# Patient Record
Sex: FEMALE | Race: WHITE | Employment: FULL TIME | URBAN - METROPOLITAN AREA
[De-identification: names, ages, dates, MRNs, and addresses within clinical notes are randomized per-mention and may not be internally consistent; named-entity substitution may affect disease eponyms.]

---

## 2017-01-04 ENCOUNTER — OFFICE VISIT (OUTPATIENT)
Dept: DERMATOLOGY CLINIC | Facility: CLINIC | Age: 62
End: 2017-01-04

## 2017-01-04 ENCOUNTER — TELEPHONE (OUTPATIENT)
Dept: OBGYN CLINIC | Facility: CLINIC | Age: 62
End: 2017-01-04

## 2017-01-04 DIAGNOSIS — L82.0 INFLAMED SEBORRHEIC KERATOSIS: Primary | ICD-10-CM

## 2017-01-04 DIAGNOSIS — Z85.828 HISTORY OF SKIN CANCER: ICD-10-CM

## 2017-01-04 DIAGNOSIS — D23.9 BENIGN NEOPLASM OF SKIN, UNSPECIFIED LOCATION: ICD-10-CM

## 2017-01-04 PROCEDURE — 99212 OFFICE O/P EST SF 10 MIN: CPT | Performed by: DERMATOLOGY

## 2017-01-04 PROCEDURE — 99213 OFFICE O/P EST LOW 20 MIN: CPT | Performed by: DERMATOLOGY

## 2017-01-04 NOTE — TELEPHONE ENCOUNTER
----- Message from SUMAN Robbins sent at 1/4/2017 12:11 PM CST -----  Please let pt know additional views of right breast are benign. We will repeat her mammogram in 1 year. Please let me know if she has any questions.     MAF

## 2017-01-30 NOTE — PROGRESS NOTES
Gerard Sullivan is a 57year old female. HPI:     CC:  Patient presents with:  Skin Cancer: Pt here for annual full body check. Pls check crusty lesion R lower back, itches. Pt also wants scalp checked as she had 2 BCC at forehead/hairline.   She feels so 3/30/2011     per NG   • Skin cancer      BCC x 2 at forehead         Past Surgical History    COLONOSCOPY  7/22/2011    Comment per NG    HYSTERECTOMY  1999    Comment TVH-enlarged uterus.  Ovaries remain       Social History   Marital Status:   Spo neck, face,nails, hair, external eyes, including conjunctival mucosa, eyelids, lips external ears, back, chest,/ breasts, axillae,  abdomen, arms, legs, palms.      Multiple light to medium brown, well marginated, uniformly pigmented, macules and papules 6 Sunscreen use, sun protection, self exams reviewed. Followup as noted RTC routine checkup 6 mos - one year or p.r.n.

## 2017-03-11 ENCOUNTER — PRIOR ORIGINAL RECORDS (OUTPATIENT)
Dept: OTHER | Age: 62
End: 2017-03-11

## 2017-03-11 ENCOUNTER — LAB ENCOUNTER (OUTPATIENT)
Dept: LAB | Age: 62
End: 2017-03-11
Attending: INTERNAL MEDICINE
Payer: COMMERCIAL

## 2017-03-11 DIAGNOSIS — Z00.00 ANNUAL PHYSICAL EXAM: ICD-10-CM

## 2017-03-11 LAB
ALBUMIN SERPL BCP-MCNC: 3.8 G/DL (ref 3.5–4.8)
ALBUMIN/GLOB SERPL: 1.3 {RATIO} (ref 1–2)
ALP SERPL-CCNC: 65 U/L (ref 32–100)
ALT SERPL-CCNC: 19 U/L (ref 14–54)
ANION GAP SERPL CALC-SCNC: 5 MMOL/L (ref 0–18)
AST SERPL-CCNC: 18 U/L (ref 15–41)
BASOPHILS # BLD: 0 K/UL (ref 0–0.2)
BASOPHILS NFR BLD: 1 %
BILIRUB SERPL-MCNC: 0.8 MG/DL (ref 0.3–1.2)
BILIRUB UR QL: NEGATIVE
BUN SERPL-MCNC: 15 MG/DL (ref 8–20)
BUN/CREAT SERPL: 18.5 (ref 10–20)
CALCIUM SERPL-MCNC: 8.9 MG/DL (ref 8.5–10.5)
CHLORIDE SERPL-SCNC: 107 MMOL/L (ref 95–110)
CHOLEST SERPL-MCNC: 199 MG/DL (ref 110–200)
CO2 SERPL-SCNC: 29 MMOL/L (ref 22–32)
COLOR UR: YELLOW
CREAT SERPL-MCNC: 0.81 MG/DL (ref 0.5–1.5)
EOSINOPHIL # BLD: 0.1 K/UL (ref 0–0.7)
EOSINOPHIL NFR BLD: 2 %
ERYTHROCYTE [DISTWIDTH] IN BLOOD BY AUTOMATED COUNT: 13.3 % (ref 11–15)
GLOBULIN PLAS-MCNC: 3 G/DL (ref 2.5–3.7)
GLUCOSE SERPL-MCNC: 104 MG/DL (ref 70–99)
GLUCOSE UR-MCNC: NEGATIVE MG/DL
HCT VFR BLD AUTO: 42.6 % (ref 35–48)
HDLC SERPL-MCNC: 52 MG/DL
HGB BLD-MCNC: 14.2 G/DL (ref 12–16)
HGB UR QL STRIP.AUTO: NEGATIVE
HYALINE CASTS #/AREA URNS AUTO: 3 /LPF
KETONES UR-MCNC: NEGATIVE MG/DL
LDLC SERPL CALC-MCNC: 128 MG/DL (ref 0–99)
LYMPHOCYTES # BLD: 1.5 K/UL (ref 1–4)
LYMPHOCYTES NFR BLD: 34 %
MCH RBC QN AUTO: 30.4 PG (ref 27–32)
MCHC RBC AUTO-ENTMCNC: 33.4 G/DL (ref 32–37)
MCV RBC AUTO: 91.2 FL (ref 80–100)
MONOCYTES # BLD: 0.3 K/UL (ref 0–1)
MONOCYTES NFR BLD: 7 %
NEUTROPHILS # BLD AUTO: 2.4 K/UL (ref 1.8–7.7)
NEUTROPHILS NFR BLD: 56 %
NITRITE UR QL STRIP.AUTO: NEGATIVE
NONHDLC SERPL-MCNC: 147 MG/DL
OSMOLALITY UR CALC.SUM OF ELEC: 293 MOSM/KG (ref 275–295)
PH UR: 5 [PH] (ref 5–8)
PLATELET # BLD AUTO: 184 K/UL (ref 140–400)
PMV BLD AUTO: 9 FL (ref 7.4–10.3)
POTASSIUM SERPL-SCNC: 3.9 MMOL/L (ref 3.3–5.1)
PROT SERPL-MCNC: 6.8 G/DL (ref 5.9–8.4)
PROT UR-MCNC: 30 MG/DL
RBC # BLD AUTO: 4.67 M/UL (ref 3.7–5.4)
RBC #/AREA URNS AUTO: 1 /HPF
SODIUM SERPL-SCNC: 141 MMOL/L (ref 136–144)
SP GR UR STRIP: 1.02 (ref 1–1.03)
TRIGL SERPL-MCNC: 95 MG/DL (ref 1–149)
TSH SERPL-ACNC: 1.7 UIU/ML (ref 0.34–5.6)
UROBILINOGEN UR STRIP-ACNC: <2
VIT C UR-MCNC: NEGATIVE MG/DL
WBC # BLD AUTO: 4.4 K/UL (ref 4–11)
WBC #/AREA URNS AUTO: 3 /HPF

## 2017-03-11 PROCEDURE — 80053 COMPREHEN METABOLIC PANEL: CPT

## 2017-03-11 PROCEDURE — 36415 COLL VENOUS BLD VENIPUNCTURE: CPT

## 2017-03-11 PROCEDURE — 81001 URINALYSIS AUTO W/SCOPE: CPT

## 2017-03-11 PROCEDURE — 85025 COMPLETE CBC W/AUTO DIFF WBC: CPT

## 2017-03-11 PROCEDURE — 84443 ASSAY THYROID STIM HORMONE: CPT

## 2017-03-11 PROCEDURE — 80061 LIPID PANEL: CPT

## 2017-05-26 LAB
ALT (SGPT): 19 U/L
AST (SGOT): 18 U/L
BUN: 15 MG/DL
CALCIUM: 8.9 MG/DL
CHLORIDE: 107 MEQ/L
CHOLESTEROL, TOTAL: 199 MG/DL
CREATININE, SERUM: 0.81 MG/DL
GLUCOSE: 104 MG/DL
HDL CHOLESTEROL: 52 MG/DL
HEMATOCRIT: 42.6 %
HEMOGLOBIN: 14.2 G/DL
LDL CHOLESTEROL: 128 MG/DL
MCH: 30.4 PG
MCHC: 33.4 G/DL
MCV: 91.2 FL
NON-HDL CHOLESTEROL: 147 MG/DL
PLATELETS: 184 K/UL
POTASSIUM, SERUM: 3.9 MEQ/L
RED BLOOD COUNT: 4.67 X 10-6/U
SGOT (AST): 18 IU/L
SGPT (ALT): 19 IU/L
SODIUM: 141 MEQ/L
TRIGLYCERIDES: 95 MG/DL
WHITE BLOOD COUNT: 4.4 X 10-3/U

## 2017-05-30 ENCOUNTER — PRIOR ORIGINAL RECORDS (OUTPATIENT)
Dept: OTHER | Age: 62
End: 2017-05-30

## 2017-08-26 ENCOUNTER — HOSPITAL ENCOUNTER (OUTPATIENT)
Dept: CT IMAGING | Facility: HOSPITAL | Age: 62
Discharge: HOME | End: 2017-08-26
Attending: INTERNAL MEDICINE

## 2017-08-26 VITALS — DIASTOLIC BLOOD PRESSURE: 57 MMHG | SYSTOLIC BLOOD PRESSURE: 123 MMHG | HEART RATE: 55 BPM

## 2017-08-26 DIAGNOSIS — Z13.6 SCREENING FOR CARDIOVASCULAR CONDITION: ICD-10-CM

## 2017-11-21 ENCOUNTER — PRIOR ORIGINAL RECORDS (OUTPATIENT)
Dept: OTHER | Age: 62
End: 2017-11-21

## 2017-11-27 ENCOUNTER — PRIOR ORIGINAL RECORDS (OUTPATIENT)
Dept: OTHER | Age: 62
End: 2017-11-27

## 2017-12-23 ENCOUNTER — APPOINTMENT (OUTPATIENT)
Dept: LAB | Facility: HOSPITAL | Age: 62
End: 2017-12-23
Attending: INTERNAL MEDICINE
Payer: COMMERCIAL

## 2017-12-23 ENCOUNTER — PRIOR ORIGINAL RECORDS (OUTPATIENT)
Dept: OTHER | Age: 62
End: 2017-12-23

## 2017-12-23 DIAGNOSIS — E78.00 PURE HYPERCHOLESTEROLEMIA: ICD-10-CM

## 2017-12-23 PROCEDURE — 84450 TRANSFERASE (AST) (SGOT): CPT

## 2017-12-23 PROCEDURE — 80061 LIPID PANEL: CPT

## 2017-12-23 PROCEDURE — 84460 ALANINE AMINO (ALT) (SGPT): CPT

## 2017-12-23 PROCEDURE — 36415 COLL VENOUS BLD VENIPUNCTURE: CPT

## 2017-12-26 LAB
ALT (SGPT): 22 U/L
AST (SGOT): 19 U/L
CHOLESTEROL, TOTAL: 199 MG/DL
HDL CHOLESTEROL: 55 MG/DL
LDL CHOLESTEROL: 125 MG/DL
TRIGLYCERIDES: 97 MG/DL

## 2017-12-27 ENCOUNTER — OFFICE VISIT (OUTPATIENT)
Dept: INTERNAL MEDICINE CLINIC | Facility: CLINIC | Age: 62
End: 2017-12-27

## 2017-12-27 ENCOUNTER — TELEPHONE (OUTPATIENT)
Dept: INTERNAL MEDICINE CLINIC | Facility: CLINIC | Age: 62
End: 2017-12-27

## 2017-12-27 VITALS
HEART RATE: 58 BPM | WEIGHT: 187.38 LBS | HEIGHT: 64.5 IN | TEMPERATURE: 98 F | BODY MASS INDEX: 31.6 KG/M2 | SYSTOLIC BLOOD PRESSURE: 138 MMHG | OXYGEN SATURATION: 98 % | DIASTOLIC BLOOD PRESSURE: 74 MMHG

## 2017-12-27 DIAGNOSIS — R73.01 IMPAIRED FASTING GLUCOSE: ICD-10-CM

## 2017-12-27 DIAGNOSIS — M85.80 OSTEOPENIA, UNSPECIFIED LOCATION: ICD-10-CM

## 2017-12-27 DIAGNOSIS — T75.3XXA MOTION SICKNESS, INITIAL ENCOUNTER: ICD-10-CM

## 2017-12-27 DIAGNOSIS — Z11.59 NEED FOR HEPATITIS C SCREENING TEST: Primary | ICD-10-CM

## 2017-12-27 DIAGNOSIS — Z00.00 ANNUAL PHYSICAL EXAM: Primary | ICD-10-CM

## 2017-12-27 DIAGNOSIS — Z23 NEED FOR VACCINATION: ICD-10-CM

## 2017-12-27 PROCEDURE — 99386 PREV VISIT NEW AGE 40-64: CPT | Performed by: INTERNAL MEDICINE

## 2017-12-27 PROCEDURE — 90715 TDAP VACCINE 7 YRS/> IM: CPT | Performed by: INTERNAL MEDICINE

## 2017-12-27 PROCEDURE — 90471 IMMUNIZATION ADMIN: CPT | Performed by: INTERNAL MEDICINE

## 2017-12-27 RX ORDER — MECLIZINE HYDROCHLORIDE 25 MG/1
25 TABLET ORAL 3 TIMES DAILY PRN
Qty: 30 TABLET | Refills: 1 | Status: SHIPPED | OUTPATIENT
Start: 2017-12-27 | End: 2018-08-23

## 2017-12-27 NOTE — PROGRESS NOTES
Ceci Mcelroy is a 60year old female.     Chief complaint:   annual physical exam   HPI:     60year old female with PMH as listed below here for   Annual physical exam   Patient reports over the last month when she is looking through the train window feel examination at a [facility] facility     Aortic valve insufficiency      REVIEW OF SYSTEMS:   A comprehensive 10 point review of systems was completed.   Pertinent positives and negatives noted in the the HPI            EXAM:   /74 (BP Location: [facility]

## 2017-12-27 NOTE — PATIENT INSTRUCTIONS
Preventing Osteoporosis: Meeting Your Calcium Needs    Your body needs calcium to build and repair bones. But it can't make calcium on its own. That's why it's important to eat calcium-rich foods. Some foods are naturally rich in calcium.  Others have eileen Certain factors can speed up bone loss or decrease bone growth. For example, alcohol, cigarettes, and certain medicines reduce bone mass. Some foods make it hard for your body to absorb calcium.     Things to avoid  Here are things to avoid to help prevent

## 2018-01-08 ENCOUNTER — PRIOR ORIGINAL RECORDS (OUTPATIENT)
Dept: OTHER | Age: 63
End: 2018-01-08

## 2018-01-10 ENCOUNTER — PRIOR ORIGINAL RECORDS (OUTPATIENT)
Dept: OTHER | Age: 63
End: 2018-01-10

## 2018-01-27 ENCOUNTER — HOSPITAL ENCOUNTER (OUTPATIENT)
Dept: MAMMOGRAPHY | Facility: HOSPITAL | Age: 63
Discharge: HOME | End: 2018-01-27
Attending: INTERNAL MEDICINE
Payer: COMMERCIAL

## 2018-01-27 DIAGNOSIS — M85.80 OSTEOPENIA, UNSPECIFIED LOCATION: ICD-10-CM

## 2018-01-27 DIAGNOSIS — Z23 NEED FOR VACCINATION: ICD-10-CM

## 2018-01-27 DIAGNOSIS — T75.3XXA MOTION SICKNESS, INITIAL ENCOUNTER: ICD-10-CM

## 2018-01-27 DIAGNOSIS — R73.01 IMPAIRED FASTING GLUCOSE: ICD-10-CM

## 2018-01-27 DIAGNOSIS — Z00.00 ANNUAL PHYSICAL EXAM: ICD-10-CM

## 2018-01-27 PROCEDURE — 77067 SCR MAMMO BI INCL CAD: CPT | Performed by: INTERNAL MEDICINE

## 2018-03-10 ENCOUNTER — APPOINTMENT (OUTPATIENT)
Dept: LAB | Age: 63
End: 2018-03-10
Attending: DERMATOLOGY
Payer: COMMERCIAL

## 2018-03-10 ENCOUNTER — OFFICE VISIT (OUTPATIENT)
Dept: DERMATOLOGY CLINIC | Facility: CLINIC | Age: 63
End: 2018-03-10

## 2018-03-10 DIAGNOSIS — L82.0 INFLAMED SEBORRHEIC KERATOSIS: ICD-10-CM

## 2018-03-10 DIAGNOSIS — D48.5 NEOPLASM OF UNCERTAIN BEHAVIOR OF SKIN: Primary | ICD-10-CM

## 2018-03-10 DIAGNOSIS — Z85.828 HISTORY OF SKIN CANCER: ICD-10-CM

## 2018-03-10 DIAGNOSIS — D23.5 BENIGN NEOPLASM OF SKIN OF TRUNK, EXCEPT SCROTUM: ICD-10-CM

## 2018-03-10 DIAGNOSIS — D23.4 BENIGN NEOPLASM OF SCALP AND SKIN OF NECK: ICD-10-CM

## 2018-03-10 DIAGNOSIS — D23.60 BENIGN NEOPLASM OF SKIN OF UPPER LIMB, INCLUDING SHOULDER, UNSPECIFIED LATERALITY: ICD-10-CM

## 2018-03-10 DIAGNOSIS — D23.30 BENIGN NEOPLASM OF SKIN OF FACE: ICD-10-CM

## 2018-03-10 PROCEDURE — 88305 TISSUE EXAM BY PATHOLOGIST: CPT | Performed by: DERMATOLOGY

## 2018-03-10 PROCEDURE — 99212 OFFICE O/P EST SF 10 MIN: CPT | Performed by: DERMATOLOGY

## 2018-03-10 PROCEDURE — 99213 OFFICE O/P EST LOW 20 MIN: CPT | Performed by: DERMATOLOGY

## 2018-03-10 PROCEDURE — 11100 BIOPSY OF SKIN LESION: CPT | Performed by: DERMATOLOGY

## 2018-03-13 NOTE — PROGRESS NOTES
The pathology report from last visit showed benign compound nevus no atypia from mid back   . Please log in test results, send biopsy results letter. Pt to rtc in the fall  or prn.

## 2018-03-13 NOTE — PROGRESS NOTES
Path as noted logged in book and letter sent per [facility]LEST[facility] 3-13-18.

## 2018-03-19 NOTE — PROGRESS NOTES
Chapito Malloy is a 60year old female. HPI:     CC:  Patient presents with:  Full Skin Exam: LOV 1/4/22017. Pt presenting for full body skin exam. Pt has a personal hx of BCC.          Allergies:  Amoxicillin    HISTORY:    Past Medical History:   Daisy Jean 1999   • Skin cancer     BCC x 2 at forehead   • Unspecified essential hypertension     drug therapy     Past Surgical History:  7/22/2011: COLONOSCOPY      Comment: rosette NORMAN  1999: HYSTERECTOMY      Comment: TVH-enlarged uterus.  Ovaries remain    Social His axillae,  abdomen, arms, legs, palms. Multiple light to medium brown, well marginated, uniformly pigmented, macules and papules 6 mm and less scattered on exam. pigmented lesions examined with dermoscopy benign-appearing patterns.      Waxy Liang Scale therapies. Please refer to map for specific lesions. See additional diagnoses. Pros cons of various therapies, risks benefits discussed. Pathophysiology discussed with patient. Therapeutic options reviewed. See  Medications in grid.   Instructions rev

## 2018-04-14 ENCOUNTER — LAB ENCOUNTER (OUTPATIENT)
Dept: LAB | Facility: HOSPITAL | Age: 63
End: 2018-04-14
Attending: INTERNAL MEDICINE
Payer: COMMERCIAL

## 2018-04-14 ENCOUNTER — PRIOR ORIGINAL RECORDS (OUTPATIENT)
Dept: OTHER | Age: 63
End: 2018-04-14

## 2018-04-14 DIAGNOSIS — M85.80 OSTEOPENIA, UNSPECIFIED LOCATION: ICD-10-CM

## 2018-04-14 DIAGNOSIS — Z11.59 NEED FOR HEPATITIS C SCREENING TEST: ICD-10-CM

## 2018-04-14 DIAGNOSIS — Z00.00 ANNUAL PHYSICAL EXAM: ICD-10-CM

## 2018-04-14 DIAGNOSIS — R73.01 IMPAIRED FASTING GLUCOSE: ICD-10-CM

## 2018-04-14 DIAGNOSIS — Z23 NEED FOR VACCINATION: ICD-10-CM

## 2018-04-14 DIAGNOSIS — E78.00 HYPERCHOLESTEREMIA: ICD-10-CM

## 2018-04-14 DIAGNOSIS — T75.3XXA MOTION SICKNESS, INITIAL ENCOUNTER: ICD-10-CM

## 2018-04-14 PROCEDURE — 80050 GENERAL HEALTH PANEL: CPT

## 2018-04-14 PROCEDURE — 80053 COMPREHEN METABOLIC PANEL: CPT

## 2018-04-14 PROCEDURE — 82248 BILIRUBIN DIRECT: CPT

## 2018-04-14 PROCEDURE — 82465 ASSAY BLD/SERUM CHOLESTEROL: CPT

## 2018-04-14 PROCEDURE — 86803 HEPATITIS C AB TEST: CPT

## 2018-04-14 PROCEDURE — 36415 COLL VENOUS BLD VENIPUNCTURE: CPT

## 2018-04-14 PROCEDURE — 85025 COMPLETE CBC W/AUTO DIFF WBC: CPT

## 2018-04-14 PROCEDURE — 83036 HEMOGLOBIN GLYCOSYLATED A1C: CPT

## 2018-04-16 ENCOUNTER — TELEPHONE (OUTPATIENT)
Dept: INTERNAL MEDICINE CLINIC | Facility: CLINIC | Age: 63
End: 2018-04-16

## 2018-04-16 DIAGNOSIS — E78.5 HYPERLIPIDEMIA, UNSPECIFIED HYPERLIPIDEMIA TYPE: Primary | ICD-10-CM

## 2018-04-16 LAB
ALBUMIN: 4 G/DL
ALT (SGPT): 17 U/L
AST (SGOT): 18 U/L
BILIRUBIN TOTAL: 0.8 MG/DL
BUN: 10 MG/DL
CALCIUM: 9.1 MG/DL
CHLORIDE: 107 MEQ/L
CHOLESTEROL, TOTAL: 214 MG/DL
CREATININE, SERUM: 0.87 MG/DL
GLOBULIN: 3.1 G/DL
GLUCOSE: 108 MG/DL
GLUCOSE: 108 MG/DL
HEMATOCRIT: 43.2 %
HEMOGLOBIN A1C: 5.8 %
HEMOGLOBIN: 14.4 G/DL
MCH: 30.8 PG
MCHC: 33.3 G/DL
MCV: 92.5 FL
PLATELETS: 201 K/UL
POTASSIUM, SERUM: 4.3 MEQ/L
PROTEIN, TOTAL: 7.1 G/DL
RED BLOOD COUNT: 4.67 X 10-6/U
SGOT (AST): 18 IU/L
SGPT (ALT): 17 IU/L
SODIUM: 141 MEQ/L
THYROID STIMULATING HORMONE: 1.9 MLU/L
WHITE BLOOD COUNT: 5.1 X 10-3/U

## 2018-06-12 ENCOUNTER — PRIOR ORIGINAL RECORDS (OUTPATIENT)
Dept: OTHER | Age: 63
End: 2018-06-12

## 2018-06-23 ENCOUNTER — OFFICE VISIT (OUTPATIENT)
Dept: INTERNAL MEDICINE CLINIC | Facility: CLINIC | Age: 63
End: 2018-06-23

## 2018-06-23 VITALS
OXYGEN SATURATION: 99 % | WEIGHT: 179.63 LBS | HEIGHT: 64.5 IN | HEART RATE: 52 BPM | TEMPERATURE: 99 F | DIASTOLIC BLOOD PRESSURE: 74 MMHG | SYSTOLIC BLOOD PRESSURE: 128 MMHG | BODY MASS INDEX: 30.29 KG/M2

## 2018-06-23 DIAGNOSIS — R73.03 PREDIABETES: Primary | ICD-10-CM

## 2018-06-23 DIAGNOSIS — I35.1 AORTIC VALVE INSUFFICIENCY, ETIOLOGY OF CARDIAC VALVE DISEASE UNSPECIFIED: ICD-10-CM

## 2018-06-23 PROCEDURE — 99213 OFFICE O/P EST LOW 20 MIN: CPT | Performed by: INTERNAL MEDICINE

## 2018-06-23 NOTE — PROGRESS NOTES
Azeem Armando is a 60year old female.     Chief complaint:follow up on chronic conditions     HPI:         60year old female with PMh as listed below here for   Follow up on chronic conditions   Patient if feeling well   No chest pain no sob no abdomina List:     Routine general medical examination at a health care facility     Aortic valve insufficiency      REVIEW OF SYSTEMS:   A comprehensive 10 point review of systems was completed.   Pertinent positives and negatives noted in the the HPI            EX

## 2018-07-21 ENCOUNTER — MYAURORA ACCOUNT LINK (OUTPATIENT)
Dept: OTHER | Age: 63
End: 2018-07-21

## 2018-08-01 ENCOUNTER — PRIOR ORIGINAL RECORDS (OUTPATIENT)
Dept: OTHER | Age: 63
End: 2018-08-01

## 2018-08-23 DIAGNOSIS — T75.3XXA MOTION SICKNESS, INITIAL ENCOUNTER: ICD-10-CM

## 2018-08-23 DIAGNOSIS — Z00.00 ANNUAL PHYSICAL EXAM: ICD-10-CM

## 2018-08-23 DIAGNOSIS — Z23 NEED FOR VACCINATION: ICD-10-CM

## 2018-08-23 DIAGNOSIS — R73.01 IMPAIRED FASTING GLUCOSE: ICD-10-CM

## 2018-08-23 DIAGNOSIS — M85.80 OSTEOPENIA, UNSPECIFIED LOCATION: ICD-10-CM

## 2018-08-23 RX ORDER — MECLIZINE HYDROCHLORIDE 25 MG/1
25 TABLET ORAL 3 TIMES DAILY PRN
Qty: 30 TABLET | Refills: 1 | Status: SHIPPED | OUTPATIENT
Start: 2018-08-23 | End: 2019-09-27

## 2019-02-18 ENCOUNTER — OFFICE VISIT (OUTPATIENT)
Dept: INTERNAL MEDICINE CLINIC | Facility: CLINIC | Age: 64
End: 2019-02-18
Payer: COMMERCIAL

## 2019-02-18 VITALS
DIASTOLIC BLOOD PRESSURE: 72 MMHG | HEART RATE: 53 BPM | RESPIRATION RATE: 18 BRPM | SYSTOLIC BLOOD PRESSURE: 110 MMHG | BODY MASS INDEX: 31.54 KG/M2 | HEIGHT: 64.5 IN | WEIGHT: 187 LBS | TEMPERATURE: 98 F | OXYGEN SATURATION: 99 %

## 2019-02-18 DIAGNOSIS — Z00.00 ANNUAL PHYSICAL EXAM: Primary | ICD-10-CM

## 2019-02-18 DIAGNOSIS — Z90.710 H/O: HYSTERECTOMY: ICD-10-CM

## 2019-02-18 DIAGNOSIS — Z12.39 SCREENING FOR BREAST CANCER: ICD-10-CM

## 2019-02-18 DIAGNOSIS — M85.80 OSTEOPENIA, UNSPECIFIED LOCATION: ICD-10-CM

## 2019-02-18 LAB
ABSOLUTE IMMATURE GRANULOCYTES (OFFPRE24): NORMAL
ALBUMIN SERPL-MCNC: 3.8 G/DL
ALBUMIN SERPL-MCNC: 3.8 G/DL (ref 3.4–5)
ALBUMIN/GLOB SERPL: 1 {RATIO}
ALBUMIN/GLOB SERPL: 1 {RATIO} (ref 1–2)
ALP LIVER SERPL-CCNC: 78 U/L (ref 50–130)
ALP SERPL-CCNC: 78 U/L
ALT SERPL-CCNC: 26 U/L
ALT SERPL-CCNC: 26 U/L (ref 13–56)
ANION GAP SERPL CALC-SCNC: 8 MMOL/L
ANION GAP SERPL CALC-SCNC: 8 MMOL/L (ref 0–18)
AST SERPL-CCNC: 16 U/L
AST SERPL-CCNC: 16 U/L (ref 15–37)
BASO+EOS+MONOS # BLD: NORMAL 10*3/UL
BASO+EOS+MONOS NFR BLD: NORMAL %
BASOPHILS # BLD AUTO: 0.03 X10(3) UL (ref 0–0.2)
BASOPHILS # BLD: NORMAL 10*3/UL
BASOPHILS NFR BLD AUTO: 0.6 %
BASOPHILS NFR BLD: NORMAL %
BILIRUB SERPL-MCNC: 0.4 MG/DL
BILIRUB SERPL-MCNC: 0.4 MG/DL (ref 0.1–2)
BUN BLD-MCNC: 14 MG/DL (ref 7–18)
BUN SERPL-MCNC: 14 MG/DL
BUN/CREAT SERPL: 14.3
BUN/CREAT SERPL: 14.3 (ref 10–20)
CALCIUM BLD-MCNC: 9.2 MG/DL (ref 8.5–10.1)
CALCIUM SERPL-MCNC: 9.2 MG/DL
CHLORIDE SERPL-SCNC: 110 MMOL/L
CHLORIDE SERPL-SCNC: 110 MMOL/L (ref 98–107)
CHOLEST SERPL-MCNC: 205 MG/DL
CHOLEST SMN-MCNC: 205 MG/DL (ref ?–200)
CHOLEST/HDLC SERPL: NORMAL {RATIO}
CO2 SERPL-SCNC: 27 MMOL/L
CO2 SERPL-SCNC: 27 MMOL/L (ref 21–32)
CREAT BLD-MCNC: 0.98 MG/DL (ref 0.55–1.02)
CREAT SERPL-MCNC: 0.98 MG/DL
DEPRECATED RDW RBC AUTO: 50.5 FL (ref 35.1–46.3)
DIFFERENTIAL METHOD BLD: NORMAL
EOSINOPHIL # BLD AUTO: 0.11 X10(3) UL (ref 0–0.7)
EOSINOPHIL # BLD: NORMAL 10*3/UL
EOSINOPHIL NFR BLD AUTO: 2.4 %
EOSINOPHIL NFR BLD: NORMAL %
ERYTHROCYTE [DISTWIDTH] IN BLOOD BY AUTOMATED COUNT: 13.9 % (ref 11–15)
ERYTHROCYTE [DISTWIDTH] IN BLOOD: NORMAL %
EST. AVERAGE GLUCOSE BLD GHB EST-MCNC: 126 MG/DL (ref 68–126)
GLOBULIN PLAS-MCNC: 3.7 G/DL (ref 2.8–4.4)
GLOBULIN SER-MCNC: 3.7 G/DL
GLUCOSE BLD-MCNC: 88 MG/DL (ref 70–99)
GLUCOSE SERPL-MCNC: 88 MG/DL
HBA1C MFR BLD HPLC: 6 % (ref ?–5.7)
HCT VFR BLD AUTO: 46.3 % (ref 35–48)
HCT VFR BLD CALC: 46.3 %
HDLC SERPL-MCNC: 61 MG/DL
HDLC SERPL-MCNC: 61 MG/DL (ref 40–59)
HGB BLD-MCNC: 14 G/DL
HGB BLD-MCNC: 14 G/DL (ref 12–16)
IMM GRANULOCYTES # BLD AUTO: 0.01 X10(3) UL (ref 0–1)
IMM GRANULOCYTES NFR BLD: 0.2 %
IMMATURE GRANULOCYTES (OFFPRE25): NORMAL
LDLC SERPL CALC-MCNC: 128 MG/DL
LDLC SERPL CALC-MCNC: 128 MG/DL (ref ?–100)
LENGTH OF FAST TIME PATIENT: NORMAL H
LENGTH OF FAST TIME PATIENT: NORMAL H
LYMPHOCYTES # BLD AUTO: 1.54 X10(3) UL (ref 1–4)
LYMPHOCYTES # BLD: NORMAL 10*3/UL
LYMPHOCYTES NFR BLD AUTO: 33 %
LYMPHOCYTES NFR BLD: NORMAL %
M PROTEIN MFR SERPL ELPH: 7.5 G/DL (ref 6.4–8.2)
MCH RBC QN AUTO: 30 PG (ref 26–34)
MCH RBC QN AUTO: NORMAL PG
MCHC RBC AUTO-ENTMCNC: 30.2 G/DL (ref 31–37)
MCHC RBC AUTO-ENTMCNC: NORMAL G/DL
MCV RBC AUTO: 99.1 FL (ref 80–100)
MCV RBC AUTO: NORMAL FL
MONOCYTES # BLD AUTO: 0.3 X10(3) UL (ref 0.1–1)
MONOCYTES # BLD: NORMAL 10*3/UL
MONOCYTES NFR BLD AUTO: 6.4 %
MONOCYTES NFR BLD: NORMAL %
MPV (OFFPRE2): NORMAL
NEUTROPHILS # BLD AUTO: 2.68 X10 (3) UL (ref 1.5–7.7)
NEUTROPHILS # BLD AUTO: 2.68 X10(3) UL (ref 1.5–7.7)
NEUTROPHILS # BLD: NORMAL 10*3/UL
NEUTROPHILS NFR BLD AUTO: 57.4 %
NEUTROPHILS NFR BLD: NORMAL %
NONHDLC SERPL-MCNC: 144 MG/DL
NONHDLC SERPL-MCNC: 144 MG/DL (ref ?–130)
NRBC BLD MANUAL-RTO: NORMAL %
OSMOLALITY SERPL CALC.SUM OF ELEC: 300 MOSM/KG (ref 275–295)
PLAT MORPH BLD: NORMAL
PLATELET # BLD AUTO: 202 10(3)UL (ref 150–450)
PLATELET # BLD: 202 10*3/UL
POTASSIUM SERPL-SCNC: 4.1 MMOL/L
POTASSIUM SERPL-SCNC: 4.1 MMOL/L (ref 3.5–5.1)
PROT SERPL-MCNC: 7.5 G/DL
RBC # BLD AUTO: 4.67 X10(6)UL (ref 3.8–5.3)
RBC # BLD: 4.67 10*6/UL
RBC MORPH BLD: NORMAL
SODIUM SERPL-SCNC: 145 MMOL/L
SODIUM SERPL-SCNC: 145 MMOL/L (ref 136–145)
TRIGL SERPL-MCNC: 78 MG/DL
TRIGL SERPL-MCNC: 78 MG/DL (ref 30–149)
TSI SER-ACNC: 1.57 MIU/ML (ref 0.36–3.74)
VLDLC SERPL CALC-MCNC: NORMAL MG/DL
WBC # BLD AUTO: 4.7 X10(3) UL (ref 4–11)
WBC # BLD: 4.7 10*3/UL
WBC MORPH BLD: NORMAL

## 2019-02-18 PROCEDURE — 82306 VITAMIN D 25 HYDROXY: CPT | Performed by: INTERNAL MEDICINE

## 2019-02-18 PROCEDURE — 80050 GENERAL HEALTH PANEL: CPT | Performed by: INTERNAL MEDICINE

## 2019-02-18 PROCEDURE — 80061 LIPID PANEL: CPT | Performed by: INTERNAL MEDICINE

## 2019-02-18 PROCEDURE — 83036 HEMOGLOBIN GLYCOSYLATED A1C: CPT | Performed by: INTERNAL MEDICINE

## 2019-02-18 PROCEDURE — 99396 PREV VISIT EST AGE 40-64: CPT | Performed by: INTERNAL MEDICINE

## 2019-02-18 NOTE — PROGRESS NOTES
Steve Matthews is a 58year old female.     Chief complaint:  Annual physical exam   HPI:     58year old female with PMH as listed below here for   Annual physical exam   Patient's last pap was couple of year ago   Hx of hysterectomy   No hx of hpv or pre 61        basal cell     Patient Active Problem List:     Routine general medical examination at a [facility] facility     Aortic valve insufficiency      REVIEW OF SYSTEMS:   A comprehensive 10 point review of systems was completed.   Pertinent positives WITH         PLATELET, COMP METABOLIC PANEL (14), LIPID         PANEL, HEMOGLOBIN A1C, TSH W REFLEX TO FREE T4,        VITAMIN D, 25-HYDROXY, THINPREP PAP WITH HPV         REFLEX REQUEST B, CBC WITH DIFFERENTIAL WITH         PLATELET, COMP METABOLIC PANEL

## 2019-02-19 LAB — LAST PAP RESULT: NORMAL

## 2019-02-20 LAB — 25(OH)D3 SERPL-MCNC: 31.9 NG/ML (ref 30–100)

## 2019-02-23 ENCOUNTER — HOSPITAL ENCOUNTER (OUTPATIENT)
Dept: BONE DENSITY | Age: 64
Discharge: HOME | End: 2019-02-23
Attending: INTERNAL MEDICINE
Payer: COMMERCIAL

## 2019-02-23 DIAGNOSIS — Z00.00 ANNUAL PHYSICAL EXAM: ICD-10-CM

## 2019-02-23 DIAGNOSIS — Z12.39 SCREENING FOR BREAST CANCER: ICD-10-CM

## 2019-02-23 DIAGNOSIS — Z90.710 H/O: HYSTERECTOMY: ICD-10-CM

## 2019-02-23 DIAGNOSIS — M85.80 OSTEOPENIA, UNSPECIFIED LOCATION: ICD-10-CM

## 2019-02-23 PROCEDURE — 77080 DXA BONE DENSITY AXIAL: CPT | Performed by: INTERNAL MEDICINE

## 2019-02-28 VITALS
BODY MASS INDEX: 30.73 KG/M2 | HEART RATE: 61 BPM | RESPIRATION RATE: 18 BRPM | SYSTOLIC BLOOD PRESSURE: 120 MMHG | WEIGHT: 180 LBS | OXYGEN SATURATION: 96 % | DIASTOLIC BLOOD PRESSURE: 78 MMHG | HEIGHT: 64 IN

## 2019-02-28 VITALS
HEIGHT: 64 IN | DIASTOLIC BLOOD PRESSURE: 64 MMHG | WEIGHT: 189 LBS | RESPIRATION RATE: 18 BRPM | SYSTOLIC BLOOD PRESSURE: 122 MMHG | HEART RATE: 56 BPM | BODY MASS INDEX: 32.27 KG/M2

## 2019-03-01 VITALS
HEART RATE: 57 BPM | SYSTOLIC BLOOD PRESSURE: 122 MMHG | HEIGHT: 64 IN | WEIGHT: 183 LBS | BODY MASS INDEX: 31.24 KG/M2 | DIASTOLIC BLOOD PRESSURE: 74 MMHG | RESPIRATION RATE: 16 BRPM

## 2019-03-16 ENCOUNTER — HOSPITAL ENCOUNTER (OUTPATIENT)
Dept: MAMMOGRAPHY | Facility: HOSPITAL | Age: 64
Discharge: HOME | End: 2019-03-16
Attending: INTERNAL MEDICINE
Payer: COMMERCIAL

## 2019-03-16 DIAGNOSIS — Z12.39 SCREENING FOR BREAST CANCER: ICD-10-CM

## 2019-03-16 DIAGNOSIS — Z00.00 ANNUAL PHYSICAL EXAM: ICD-10-CM

## 2019-03-16 DIAGNOSIS — Z90.710 H/O: HYSTERECTOMY: ICD-10-CM

## 2019-03-16 DIAGNOSIS — M85.80 OSTEOPENIA, UNSPECIFIED LOCATION: ICD-10-CM

## 2019-03-16 PROCEDURE — 77067 SCR MAMMO BI INCL CAD: CPT | Performed by: INTERNAL MEDICINE

## 2019-03-16 PROCEDURE — 77063 BREAST TOMOSYNTHESIS BI: CPT | Performed by: INTERNAL MEDICINE

## 2019-03-26 RX ORDER — LISINOPRIL 5 MG/1
TABLET ORAL
Qty: 90 TABLET | Refills: 2 | Status: SHIPPED | OUTPATIENT
Start: 2019-03-26 | End: 2019-12-31 | Stop reason: SDUPTHER

## 2019-03-28 RX ORDER — MECLIZINE HYDROCHLORIDE 25 MG/1
25 TABLET ORAL 3 TIMES DAILY PRN
COMMUNITY

## 2019-03-29 ENCOUNTER — HOSPITAL ENCOUNTER (OUTPATIENT)
Dept: ULTRASOUND IMAGING | Facility: HOSPITAL | Age: 64
Discharge: HOME | End: 2019-03-29
Attending: INTERNAL MEDICINE
Payer: COMMERCIAL

## 2019-03-29 ENCOUNTER — HOSPITAL ENCOUNTER (OUTPATIENT)
Dept: MAMMOGRAPHY | Facility: HOSPITAL | Age: 64
Discharge: HOME | End: 2019-03-29
Attending: INTERNAL MEDICINE
Payer: COMMERCIAL

## 2019-03-29 DIAGNOSIS — R92.8 ABNORMAL MAMMOGRAM: ICD-10-CM

## 2019-03-29 PROCEDURE — 77061 BREAST TOMOSYNTHESIS UNI: CPT | Performed by: INTERNAL MEDICINE

## 2019-03-29 PROCEDURE — 77065 DX MAMMO INCL CAD UNI: CPT | Performed by: INTERNAL MEDICINE

## 2019-03-29 PROCEDURE — 76642 ULTRASOUND BREAST LIMITED: CPT | Performed by: INTERNAL MEDICINE

## 2019-05-13 ENCOUNTER — OFFICE VISIT (OUTPATIENT)
Dept: DERMATOLOGY CLINIC | Facility: CLINIC | Age: 64
End: 2019-05-13
Payer: COMMERCIAL

## 2019-05-13 VITALS — BODY MASS INDEX: 31.58 KG/M2 | WEIGHT: 185 LBS | HEIGHT: 64 IN

## 2019-05-13 DIAGNOSIS — L82.0 INFLAMED SEBORRHEIC KERATOSIS: Primary | ICD-10-CM

## 2019-05-13 DIAGNOSIS — Z85.828 HISTORY OF NONMELANOMA SKIN CANCER: ICD-10-CM

## 2019-05-13 DIAGNOSIS — D23.5 BENIGN NEOPLASM OF SKIN OF TRUNK, EXCEPT SCROTUM: ICD-10-CM

## 2019-05-13 DIAGNOSIS — D23.30 BENIGN NEOPLASM OF SKIN OF FACE: ICD-10-CM

## 2019-05-13 DIAGNOSIS — D23.60 BENIGN NEOPLASM OF SKIN OF UPPER LIMB, INCLUDING SHOULDER, UNSPECIFIED LATERALITY: ICD-10-CM

## 2019-05-13 DIAGNOSIS — D23.4 BENIGN NEOPLASM OF SCALP AND SKIN OF NECK: ICD-10-CM

## 2019-05-13 DIAGNOSIS — D22.9 MULTIPLE NEVI: ICD-10-CM

## 2019-05-13 PROCEDURE — 99212 OFFICE O/P EST SF 10 MIN: CPT | Performed by: DERMATOLOGY

## 2019-05-13 PROCEDURE — 99214 OFFICE O/P EST MOD 30 MIN: CPT | Performed by: DERMATOLOGY

## 2019-05-16 RX ORDER — FAMOTIDINE 20 MG
1000 TABLET ORAL DAILY
COMMUNITY
Start: 2013-12-24

## 2019-05-26 NOTE — PROGRESS NOTES
Carmine Christine is a 58year old female.   HPI:     CC:  Patient presents with:  Moles: skin check        Allergies:  Amoxicillin    HISTORY:    Past Medical History:   Diagnosis Date   • Basal cell carcinoma     wide excision, 2010   • Essential tremor hypertension     drug therapy     Past Surgical History:   Procedure Laterality Date   • COLONOSCOPY  7/22/2011    per NG   • HYSTERECTOMY  1999    TVH-enlarged uterus.  Ovaries remain     Social History    Socioeconomic History      Marital status:  Asked        Back Care: Not Asked        Exercise: Not Asked        Bike Helmet: Not Asked        Seat Belt: Not Asked        Self-Exams: Not Asked        Grew up on a farm: Not Asked        History of tanning: Not Asked        Outdoor occupation: Not Aske Santana Baker for additional history and physical exam also:    Assessment / plan:    No orders of the defined types were placed in this encounter.       Meds & Refills for this Visit:  Requested Prescriptions      No prescriptions requested or ordered in this enco melanoma discussed with patient. Sunscreen use, sun protection, self exams reviewed. Followup as noted RTC routine checkup 6 mos - one year or p.r.n. The patient indicates understanding of these issues and agrees to the plan.   The patient is asked to r

## 2019-05-28 ENCOUNTER — OFFICE VISIT (OUTPATIENT)
Dept: CARDIOLOGY | Age: 64
End: 2019-05-28

## 2019-05-28 ENCOUNTER — TELEPHONE (OUTPATIENT)
Dept: CARDIOLOGY | Age: 64
End: 2019-05-28

## 2019-05-28 VITALS
DIASTOLIC BLOOD PRESSURE: 70 MMHG | HEIGHT: 64 IN | BODY MASS INDEX: 31.41 KG/M2 | WEIGHT: 184 LBS | HEART RATE: 54 BPM | OXYGEN SATURATION: 98 % | SYSTOLIC BLOOD PRESSURE: 118 MMHG

## 2019-05-28 DIAGNOSIS — R07.89 ATYPICAL CHEST PAIN: ICD-10-CM

## 2019-05-28 DIAGNOSIS — I35.1 NONRHEUMATIC AORTIC VALVE INSUFFICIENCY: Primary | ICD-10-CM

## 2019-05-28 DIAGNOSIS — E78.00 PURE HYPERCHOLESTEROLEMIA: ICD-10-CM

## 2019-05-28 DIAGNOSIS — I10 ESSENTIAL HYPERTENSION: ICD-10-CM

## 2019-05-28 PROCEDURE — 3078F DIAST BP <80 MM HG: CPT | Performed by: INTERNAL MEDICINE

## 2019-05-28 PROCEDURE — 3074F SYST BP LT 130 MM HG: CPT | Performed by: INTERNAL MEDICINE

## 2019-05-28 PROCEDURE — 93000 ELECTROCARDIOGRAM COMPLETE: CPT | Performed by: INTERNAL MEDICINE

## 2019-05-28 PROCEDURE — 99214 OFFICE O/P EST MOD 30 MIN: CPT | Performed by: INTERNAL MEDICINE

## 2019-05-28 ASSESSMENT — PATIENT HEALTH QUESTIONNAIRE - PHQ9
SUM OF ALL RESPONSES TO PHQ9 QUESTIONS 1 AND 2: 0
2. FEELING DOWN, DEPRESSED OR HOPELESS: NOT AT ALL
1. LITTLE INTEREST OR PLEASURE IN DOING THINGS: NOT AT ALL
SUM OF ALL RESPONSES TO PHQ9 QUESTIONS 1 AND 2: 0

## 2019-06-12 DIAGNOSIS — I10 ESSENTIAL HYPERTENSION: ICD-10-CM

## 2019-06-12 DIAGNOSIS — E78.00 PURE HYPERCHOLESTEROLEMIA: ICD-10-CM

## 2019-06-12 DIAGNOSIS — R07.89 ATYPICAL CHEST PAIN: ICD-10-CM

## 2019-07-26 ENCOUNTER — TELEPHONE (OUTPATIENT)
Dept: CARDIOLOGY | Age: 64
End: 2019-07-26

## 2019-07-28 ENCOUNTER — HOSPITAL ENCOUNTER (OUTPATIENT)
Age: 64
Discharge: HOME | End: 2019-07-28
Payer: COMMERCIAL

## 2019-07-28 ENCOUNTER — APPOINTMENT (OUTPATIENT)
Dept: GENERAL RADIOLOGY | Age: 64
End: 2019-07-28
Attending: NURSE PRACTITIONER
Payer: COMMERCIAL

## 2019-07-28 VITALS
RESPIRATION RATE: 20 BRPM | SYSTOLIC BLOOD PRESSURE: 135 MMHG | WEIGHT: 184 LBS | BODY MASS INDEX: 31.41 KG/M2 | DIASTOLIC BLOOD PRESSURE: 68 MMHG | HEART RATE: 50 BPM | TEMPERATURE: 98 F | OXYGEN SATURATION: 97 % | HEIGHT: 64 IN

## 2019-07-28 DIAGNOSIS — S20.212A CONTUSION OF RIB ON LEFT SIDE, INITIAL ENCOUNTER: Primary | ICD-10-CM

## 2019-07-28 PROCEDURE — 99213 OFFICE O/P EST LOW 20 MIN: CPT

## 2019-07-28 PROCEDURE — 71101 X-RAY EXAM UNILAT RIBS/CHEST: CPT | Performed by: NURSE PRACTITIONER

## 2019-07-28 PROCEDURE — 99204 OFFICE O/P NEW MOD 45 MIN: CPT

## 2019-07-28 RX ORDER — TRAMADOL HYDROCHLORIDE 50 MG/1
TABLET ORAL EVERY 6 HOURS PRN
Qty: 10 TABLET | Refills: 0 | Status: SHIPPED | OUTPATIENT
Start: 2019-07-28 | End: 2019-08-04

## 2019-07-28 NOTE — ED PROVIDER NOTES
Patient presents with:  Trauma (cardiovascular, musculoskeletal)      HPI:     This 58year old female presents with a chief complaint of chest pain under her left breast.  Patient reports she was riding on the train 3 days ago when she leaned over and arm go to the emergency department. Otherwise, follow-up with her primary care provider. Xr Ribs With Chest (3 Views), Left  (cpt=71101)    Result Date: 7/28/2019  CONCLUSION:  1. Negative left rib series. 2. Degenerative changes in the spine.     Dictated

## 2019-07-28 NOTE — ED INITIAL ASSESSMENT (HPI)
Riding on the train 3 days ago. Leaned over armrest to  something when the train motion caused her to hit left ribs on armrest. Tender to touch. Painful to take deep breath. No respiratory distress.

## 2019-09-27 ENCOUNTER — HOSPITAL ENCOUNTER (OUTPATIENT)
Dept: CV DIAGNOSTICS | Facility: HOSPITAL | Age: 64
Discharge: HOME | End: 2019-09-27
Attending: INTERNAL MEDICINE
Payer: COMMERCIAL

## 2019-09-27 DIAGNOSIS — Z23 NEED FOR VACCINATION: ICD-10-CM

## 2019-09-27 DIAGNOSIS — I10 ESSENTIAL HYPERTENSION, BENIGN: ICD-10-CM

## 2019-09-27 DIAGNOSIS — R07.89 ATYPICAL CHEST PAIN: ICD-10-CM

## 2019-09-27 DIAGNOSIS — E78.00 PURE HYPERCHOLESTEROLEMIA: ICD-10-CM

## 2019-09-27 DIAGNOSIS — R73.01 IMPAIRED FASTING GLUCOSE: ICD-10-CM

## 2019-09-27 DIAGNOSIS — M85.80 OSTEOPENIA, UNSPECIFIED LOCATION: ICD-10-CM

## 2019-09-27 DIAGNOSIS — Z00.00 ANNUAL PHYSICAL EXAM: ICD-10-CM

## 2019-09-27 DIAGNOSIS — T75.3XXA MOTION SICKNESS, INITIAL ENCOUNTER: ICD-10-CM

## 2019-09-27 PROCEDURE — 93017 CV STRESS TEST TRACING ONLY: CPT | Performed by: INTERNAL MEDICINE

## 2019-09-27 PROCEDURE — 93018 CV STRESS TEST I&R ONLY: CPT | Performed by: INTERNAL MEDICINE

## 2019-09-27 PROCEDURE — 93016 CV STRESS TEST SUPVJ ONLY: CPT | Performed by: INTERNAL MEDICINE

## 2019-09-27 PROCEDURE — 93350 STRESS TTE ONLY: CPT | Performed by: INTERNAL MEDICINE

## 2019-09-27 NOTE — IMAGING NOTE
Patient is here for stress echo  Exercised for 5 minutes and 03 seconds. Denies any chest discomfort. ECG changes noted during exercise. Dr Lindsey Osuna cardiologist go to made aware of the above. Recommended to have the patient follow up with the cardilogist an

## 2019-09-30 ENCOUNTER — TELEPHONE (OUTPATIENT)
Dept: CARDIOLOGY | Age: 64
End: 2019-09-30

## 2019-10-01 RX ORDER — MECLIZINE HYDROCHLORIDE 25 MG/1
25 TABLET ORAL 3 TIMES DAILY PRN
Qty: 30 TABLET | Refills: 1 | Status: SHIPPED | OUTPATIENT
Start: 2019-10-01 | End: 2021-09-19

## 2019-10-02 RX ORDER — TRAMADOL HYDROCHLORIDE 50 MG/1
50 TABLET ORAL DAILY
Refills: 0 | COMMUNITY
Start: 2019-07-28 | End: 2019-10-04 | Stop reason: ALTCHOICE

## 2019-10-04 ENCOUNTER — OFFICE VISIT (OUTPATIENT)
Dept: CARDIOLOGY | Age: 64
End: 2019-10-04

## 2019-10-04 ENCOUNTER — ORDER TRANSCRIPTION (OUTPATIENT)
Dept: ADMINISTRATIVE | Facility: HOSPITAL | Age: 64
End: 2019-10-04

## 2019-10-04 ENCOUNTER — TELEPHONE (OUTPATIENT)
Dept: CARDIOLOGY | Age: 64
End: 2019-10-04

## 2019-10-04 VITALS
DIASTOLIC BLOOD PRESSURE: 70 MMHG | OXYGEN SATURATION: 98 % | WEIGHT: 188 LBS | SYSTOLIC BLOOD PRESSURE: 130 MMHG | BODY MASS INDEX: 32.1 KG/M2 | HEART RATE: 50 BPM | HEIGHT: 64 IN

## 2019-10-04 DIAGNOSIS — E78.00 PURE HYPERCHOLESTEROLEMIA: ICD-10-CM

## 2019-10-04 DIAGNOSIS — R07.82 INTERCOSTAL PAIN: ICD-10-CM

## 2019-10-04 DIAGNOSIS — R94.39 ABNORMAL STRESS ECG: Primary | ICD-10-CM

## 2019-10-04 DIAGNOSIS — I35.1 NONRHEUMATIC AORTIC VALVE INSUFFICIENCY: Primary | ICD-10-CM

## 2019-10-04 DIAGNOSIS — R94.39 ABNORMAL STRESS ECG: ICD-10-CM

## 2019-10-04 DIAGNOSIS — I10 ESSENTIAL HYPERTENSION: ICD-10-CM

## 2019-10-04 PROCEDURE — 99214 OFFICE O/P EST MOD 30 MIN: CPT | Performed by: INTERNAL MEDICINE

## 2019-10-11 ENCOUNTER — TELEPHONE (OUTPATIENT)
Dept: CARDIOLOGY | Age: 64
End: 2019-10-11

## 2019-10-14 ENCOUNTER — HOSPITAL ENCOUNTER (OUTPATIENT)
Dept: CT IMAGING | Facility: HOSPITAL | Age: 64
Discharge: HOME | End: 2019-10-14
Attending: INTERNAL MEDICINE
Payer: MEDICARE

## 2019-10-14 ENCOUNTER — TELEPHONE (OUTPATIENT)
Dept: CARDIOLOGY | Age: 64
End: 2019-10-14

## 2019-10-14 VITALS
DIASTOLIC BLOOD PRESSURE: 77 MMHG | SYSTOLIC BLOOD PRESSURE: 125 MMHG | OXYGEN SATURATION: 96 % | RESPIRATION RATE: 16 BRPM | HEART RATE: 56 BPM | WEIGHT: 185 LBS | BODY MASS INDEX: 31.58 KG/M2 | HEIGHT: 64 IN

## 2019-10-14 DIAGNOSIS — R07.82 INTERCOSTAL PAIN: ICD-10-CM

## 2019-10-14 DIAGNOSIS — R94.39 ABNORMAL STRESS ECG: ICD-10-CM

## 2019-10-14 PROCEDURE — 82565 ASSAY OF CREATININE: CPT

## 2019-10-14 PROCEDURE — 75574 CT ANGIO HRT W/3D IMAGE: CPT | Performed by: INTERNAL MEDICINE

## 2019-10-14 RX ORDER — METOPROLOL TARTRATE 5 MG/5ML
INJECTION INTRAVENOUS
Status: DISCONTINUED
Start: 2019-10-14 | End: 2019-10-14

## 2019-10-14 RX ORDER — NITROGLYCERIN 0.4 MG/1
0.4 TABLET SUBLINGUAL ONCE
Status: COMPLETED | OUTPATIENT
Start: 2019-10-14 | End: 2019-10-14

## 2019-10-14 RX ORDER — METOPROLOL TARTRATE 5 MG/5ML
5 INJECTION INTRAVENOUS SEE ADMIN INSTRUCTIONS
Status: DISCONTINUED | OUTPATIENT
Start: 2019-10-14 | End: 2019-10-16

## 2019-10-14 RX ORDER — DILTIAZEM HYDROCHLORIDE 5 MG/ML
5 INJECTION INTRAVENOUS SEE ADMIN INSTRUCTIONS
Status: DISCONTINUED | OUTPATIENT
Start: 2019-10-14 | End: 2019-10-16

## 2019-10-14 RX ORDER — NITROGLYCERIN 0.4 MG/1
TABLET SUBLINGUAL
Status: DISCONTINUED
Start: 2019-10-14 | End: 2019-10-14

## 2019-10-14 RX ADMIN — NITROGLYCERIN 0.4 MG: 0.4 TABLET SUBLINGUAL at 10:06:00

## 2019-10-14 NOTE — IMAGING NOTE
TO RAD HOLDING AT 0919.      HX TAKEN: Abnormal Stress EKG     PT CONSENTED AT 0925. 0931: VS: 125/77  HR 55/MIN  RR 16/MIN SPO2 97%     18 GAUGE IV STARTED AT 0945     POC TESTING COMPLETED  GFR = 78   CREATINE = 0.8    CTA ORDERED BY Kurt Rogers MD

## 2019-10-16 DIAGNOSIS — R07.82 INTERCOSTAL PAIN: ICD-10-CM

## 2019-10-16 DIAGNOSIS — R94.39 ABNORMAL STRESS ECG: ICD-10-CM

## 2019-10-16 PROCEDURE — X1094 CTA HEART CORONARY ARTERIES: HCPCS | Performed by: INTERNAL MEDICINE

## 2019-11-29 ENCOUNTER — HOSPITAL ENCOUNTER (OUTPATIENT)
Dept: MAMMOGRAPHY | Facility: HOSPITAL | Age: 64
Discharge: HOME | End: 2019-11-29
Attending: INTERNAL MEDICINE
Payer: MEDICARE

## 2019-11-29 DIAGNOSIS — R92.8 ABNORMAL MAMMOGRAM: ICD-10-CM

## 2019-11-29 PROCEDURE — 77065 DX MAMMO INCL CAD UNI: CPT | Performed by: INTERNAL MEDICINE

## 2019-11-29 PROCEDURE — 77061 BREAST TOMOSYNTHESIS UNI: CPT | Performed by: INTERNAL MEDICINE

## 2019-12-02 DIAGNOSIS — Z12.39 SCREENING FOR BREAST CANCER: Primary | ICD-10-CM

## 2019-12-31 RX ORDER — LISINOPRIL 5 MG/1
TABLET ORAL
Qty: 90 TABLET | Refills: 3 | Status: SHIPPED | OUTPATIENT
Start: 2019-12-31

## 2020-01-03 ENCOUNTER — TELEPHONE (OUTPATIENT)
Dept: CARDIOLOGY | Age: 65
End: 2020-01-03

## 2020-01-03 RX ORDER — LISINOPRIL 5 MG/1
5 TABLET ORAL DAILY
Qty: 90 TABLET | Refills: 0 | Status: SHIPPED | OUTPATIENT
Start: 2020-01-03

## 2020-06-08 ENCOUNTER — OFFICE VISIT (OUTPATIENT)
Dept: INTERNAL MEDICINE CLINIC | Facility: CLINIC | Age: 65
End: 2020-06-08
Payer: MEDICARE

## 2020-06-08 VITALS
SYSTOLIC BLOOD PRESSURE: 130 MMHG | OXYGEN SATURATION: 98 % | HEART RATE: 75 BPM | DIASTOLIC BLOOD PRESSURE: 82 MMHG | RESPIRATION RATE: 20 BRPM | BODY MASS INDEX: 32.95 KG/M2 | HEIGHT: 64 IN | WEIGHT: 193 LBS

## 2020-06-08 DIAGNOSIS — R92.2 INCONCLUSIVE MAMMOGRAM: ICD-10-CM

## 2020-06-08 DIAGNOSIS — N63.0 BREAST LUMP: Primary | ICD-10-CM

## 2020-06-08 DIAGNOSIS — R92.8 ABNORMAL MAMMOGRAM: ICD-10-CM

## 2020-06-08 DIAGNOSIS — B35.9 TINEA: ICD-10-CM

## 2020-06-08 PROCEDURE — 99213 OFFICE O/P EST LOW 20 MIN: CPT | Performed by: INTERNAL MEDICINE

## 2020-06-08 RX ORDER — CLOTRIMAZOLE AND BETAMETHASONE DIPROPIONATE 10; .64 MG/G; MG/G
1 CREAM TOPICAL 2 TIMES DAILY PRN
Qty: 1 TUBE | Refills: 0 | Status: SHIPPED | OUTPATIENT
Start: 2020-06-08 | End: 2020-06-18

## 2020-06-08 RX ORDER — SULFAMETHOXAZOLE AND TRIMETHOPRIM 800; 160 MG/1; MG/1
1 TABLET ORAL 2 TIMES DAILY
Qty: 20 TABLET | Refills: 0 | Status: SHIPPED | OUTPATIENT
Start: 2020-06-08 | End: 2020-06-18

## 2020-06-08 NOTE — PROGRESS NOTES
Sukh Falk is a 71year old female.     Chief complaint: breast lump     HPI:   71year old female with PMH as listed below here for   Breast lump   Noticed it on Saturday  2 days ago   Left breast    red   No pain   No discharge   No fever or chills general medical examination at a [facility]     Aortic valve insufficiency      REVIEW OF SYSTEMS:   A comprehensive 10 point review of systems was completed.   Pertinent positives and negatives noted in the the HPI            EXAM:   /82 (B

## 2020-06-12 ENCOUNTER — HOSPITAL ENCOUNTER (OUTPATIENT)
Dept: ULTRASOUND IMAGING | Facility: HOSPITAL | Age: 65
Discharge: HOME | End: 2020-06-12
Attending: INTERNAL MEDICINE
Payer: MEDICARE

## 2020-06-12 ENCOUNTER — HOSPITAL ENCOUNTER (OUTPATIENT)
Dept: MAMMOGRAPHY | Facility: HOSPITAL | Age: 65
Discharge: HOME | End: 2020-06-12
Attending: INTERNAL MEDICINE
Payer: MEDICARE

## 2020-06-12 DIAGNOSIS — R92.8 ABNORMAL MAMMOGRAM: ICD-10-CM

## 2020-06-12 DIAGNOSIS — Z12.39 SCREENING FOR BREAST CANCER: ICD-10-CM

## 2020-06-12 DIAGNOSIS — B35.9 TINEA: ICD-10-CM

## 2020-06-12 DIAGNOSIS — N63.0 BREAST LUMP: ICD-10-CM

## 2020-06-12 DIAGNOSIS — R92.2 INCONCLUSIVE MAMMOGRAM: ICD-10-CM

## 2020-06-12 PROCEDURE — 76642 ULTRASOUND BREAST LIMITED: CPT | Performed by: INTERNAL MEDICINE

## 2020-06-12 PROCEDURE — 77062 BREAST TOMOSYNTHESIS BI: CPT | Performed by: INTERNAL MEDICINE

## 2020-06-12 PROCEDURE — 77066 DX MAMMO INCL CAD BI: CPT | Performed by: INTERNAL MEDICINE

## 2020-10-09 ENCOUNTER — APPOINTMENT (OUTPATIENT)
Dept: CARDIOLOGY | Age: 65
End: 2020-10-09

## 2020-11-06 ENCOUNTER — TELEPHONE (OUTPATIENT)
Dept: INTERNAL MEDICINE CLINIC | Facility: CLINIC | Age: 65
End: 2020-11-06

## 2020-11-06 NOTE — TELEPHONE ENCOUNTER
Patient will need to schedule an appt to discuss her risk factors   And situation   Please inform patient   MT

## 2020-11-06 NOTE — TELEPHONE ENCOUNTER
Patient is questing a letter for her employer stating she can work from home due to her age. Please call and advise.

## 2020-12-19 ENCOUNTER — OFFICE VISIT (OUTPATIENT)
Dept: INTERNAL MEDICINE CLINIC | Facility: CLINIC | Age: 65
End: 2020-12-19
Payer: MEDICARE

## 2020-12-19 VITALS
HEART RATE: 56 BPM | WEIGHT: 193.63 LBS | OXYGEN SATURATION: 99 % | HEIGHT: 64 IN | TEMPERATURE: 97 F | DIASTOLIC BLOOD PRESSURE: 72 MMHG | SYSTOLIC BLOOD PRESSURE: 138 MMHG | BODY MASS INDEX: 33.06 KG/M2

## 2020-12-19 DIAGNOSIS — R73.03 PREDIABETES: ICD-10-CM

## 2020-12-19 DIAGNOSIS — M79.601 RIGHT ARM PAIN: ICD-10-CM

## 2020-12-19 DIAGNOSIS — Z23 NEED FOR VACCINATION: ICD-10-CM

## 2020-12-19 DIAGNOSIS — Z78.0 ASYMPTOMATIC MENOPAUSE: ICD-10-CM

## 2020-12-19 DIAGNOSIS — Z12.31 ENCOUNTER FOR SCREENING MAMMOGRAM FOR MALIGNANT NEOPLASM OF BREAST: ICD-10-CM

## 2020-12-19 DIAGNOSIS — E78.5 HYPERLIPIDEMIA, UNSPECIFIED HYPERLIPIDEMIA TYPE: ICD-10-CM

## 2020-12-19 DIAGNOSIS — Q24.8 ABNORMALITY OF HEART VALVE: ICD-10-CM

## 2020-12-19 DIAGNOSIS — Z90.710 H/O: HYSTERECTOMY: ICD-10-CM

## 2020-12-19 DIAGNOSIS — Z00.00 MEDICARE ANNUAL WELLNESS VISIT, INITIAL: Primary | ICD-10-CM

## 2020-12-19 DIAGNOSIS — M85.80 OSTEOPENIA, UNSPECIFIED LOCATION: ICD-10-CM

## 2020-12-19 DIAGNOSIS — I38 VALVULAR HEART DISEASE: ICD-10-CM

## 2020-12-19 DIAGNOSIS — Z85.828 HISTORY OF BASAL CELL CARCINOMA OF SKIN: ICD-10-CM

## 2020-12-19 PROCEDURE — G0438 PPPS, INITIAL VISIT: HCPCS | Performed by: INTERNAL MEDICINE

## 2020-12-19 PROCEDURE — G0008 ADMIN INFLUENZA VIRUS VAC: HCPCS | Performed by: INTERNAL MEDICINE

## 2020-12-19 PROCEDURE — 85025 COMPLETE CBC W/AUTO DIFF WBC: CPT | Performed by: INTERNAL MEDICINE

## 2020-12-19 PROCEDURE — 80061 LIPID PANEL: CPT | Performed by: INTERNAL MEDICINE

## 2020-12-19 PROCEDURE — 83036 HEMOGLOBIN GLYCOSYLATED A1C: CPT | Performed by: INTERNAL MEDICINE

## 2020-12-19 PROCEDURE — 80053 COMPREHEN METABOLIC PANEL: CPT | Performed by: INTERNAL MEDICINE

## 2020-12-19 PROCEDURE — G0009 ADMIN PNEUMOCOCCAL VACCINE: HCPCS | Performed by: INTERNAL MEDICINE

## 2020-12-19 PROCEDURE — 90662 IIV NO PRSV INCREASED AG IM: CPT | Performed by: INTERNAL MEDICINE

## 2020-12-19 PROCEDURE — 84443 ASSAY THYROID STIM HORMONE: CPT | Performed by: INTERNAL MEDICINE

## 2020-12-19 PROCEDURE — 90670 PCV13 VACCINE IM: CPT | Performed by: INTERNAL MEDICINE

## 2020-12-19 RX ORDER — CYCLOBENZAPRINE HCL 5 MG
5 TABLET ORAL NIGHTLY PRN
Qty: 10 TABLET | Refills: 0 | Status: SHIPPED | OUTPATIENT
Start: 2020-12-19 | End: 2020-12-29

## 2020-12-19 RX ORDER — LISINOPRIL 5 MG/1
5 TABLET ORAL DAILY
COMMUNITY
Start: 2019-12-31

## 2020-12-19 RX ORDER — LISINOPRIL 5 MG/1
5 TABLET ORAL DAILY
Qty: 90 TABLET | Refills: 3 | Status: SHIPPED | OUTPATIENT
Start: 2020-12-19 | End: 2021-12-23

## 2020-12-19 RX ORDER — MELOXICAM 15 MG/1
15 TABLET ORAL DAILY
Qty: 7 TABLET | Refills: 0 | Status: SHIPPED | OUTPATIENT
Start: 2020-12-19 | End: 2020-12-26

## 2020-12-19 RX ORDER — MECLIZINE HYDROCHLORIDE 25 MG/1
25 TABLET ORAL
COMMUNITY

## 2020-12-19 NOTE — PROGRESS NOTES
.mt    HPI:   Mihaela Kidd is a 76year old female who presents for a Medicare Initial Preventative Physical exam.    Patient Active Problem List:     Routine general medical examination at a health care facility     Aortic valve insufficiency     Asymp Status     Hearing Problems?: No    Vision Problems? : No    Difficulty walking?: No    Difficulty dressing or bathing?: No    Problems with daily activities? : No    Memory Problems?: No      Fall/Risk Assessment          Have you fallen in the last 12 mo Fluocinonide 0.05 % External Cream Use bid for eczema on legs (Patient not taking: Reported on 12/19/2020 ) 60 g 3   • cholecalciferol (KP VITAMIN D) 1000 UNITS Oral Cap Take 1,000 Units by mouth daily.         MEDICAL INFORMATION:   Past Medical History: well nourished, in no apparent distress  SKIN multiple pigmented nevi   HEENT: atraumatic, normocephalic, ears and throat are clear     EYES: PERRLA, EOMI, conjunctiva are clear  Right Eye Visual Acuity: Corrected Left Eye Visual Acuity: Corrected   Right VACC, 13 KARUNA IM, XR CERVICAL SPINE (2 VIEWS)         (CPT=72040), Meloxicam 15 MG Oral Tab,         cyclobenzaprine 5 MG Oral Tab, CBC WITH         DIFFERENTIAL WITH PLATELET, COMP METABOLIC         PANEL (14), LIPID PANEL, HEMOGLOBIN A1C, TSH W Meclizine HCl 25 MG         Oral Tab, XR DEXA BONE DENSITOMETRY         (CPT=88900), AUDREY SCREENING BILAT (CPT=33442),         CARDIO - INTERNAL, lisinopril 5 MG Oral Tab,         FLU VACC HIGH DOSE PRSV FREE, PNEUMOCOCCAL         VACC, 13 KARUNA IM, XR CERVIC METABOLIC PANEL (14), LIPID         PANEL, HEMOGLOBIN A1C, TSH W REFLEX TO FREE T4    (M79.601) Right arm pain  Plan: lisinopril 5 MG Oral Tab, Meclizine HCl 25 MG         Oral Tab, XR DEXA BONE DENSITOMETRY         (CPT=66026), [facility] SCREENING BILAT (CPT=77 COMP METABOLIC         PANEL (14), LIPID PANEL, HEMOGLOBIN A1C, TSH W         REFLEX TO FREE T4, CBC WITH DIFFERENTIAL WITH         PLATELET, COMP METABOLIC PANEL (14), LIPID         PANEL, HEMOGLOBIN A1C, TSH W REFLEX TO FREE T4    (E78.5) Hyperlipidemia, INTERNAL  - lisinopril 5 MG Oral Tab; Take 1 tablet (5 mg total) by mouth daily. Dispense: 90 tablet; Refill: 3  - FLU VACC HIGH DOSE PRSV FREE  - PNEUMOCOCCAL VACC, 13 KARUNA IM  - XR CERVICAL SPINE (2 VIEWS) (CPT=88980);  Future  - Meloxicam 15 MG Oral Tab; lisinopril 5 MG Oral Tab; Take 5 mg by mouth daily.  - Meclizine HCl 25 MG Oral Tab; Take 25 mg by mouth. - XR DEXA BONE DENSITOMETRY (CPT=77035); Future  - AUDREY SCREENING BILAT (CPT=77012); Future  - CARDIO - INTERNAL  - lisinopril 5 MG Oral Tab;  Take 1 t PLATELET  - COMP METABOLIC PANEL (14)  - LIPID PANEL  - HEMOGLOBIN A1C  - TSH W REFLEX TO FREE T4    6. Valvular heart disease  Referral to cardiology     - lisinopril 5 MG Oral Tab; Take 5 mg by mouth daily.  - Meclizine HCl 25 MG Oral Tab;  Take 25 mg by needed for Muscle spasms. Dispense: 10 tablet; Refill: 0  - CBC WITH DIFFERENTIAL WITH PLATELET; Future  - COMP METABOLIC PANEL (14); Future  - LIPID PANEL;  Future  - HEMOGLOBIN A1C; Future  - TSH W REFLEX TO FREE T4; Future  - CBC WITH DIFFERENTIAL WITH days.  Dispense: 7 tablet; Refill: 0  - cyclobenzaprine 5 MG Oral Tab; Take 1 tablet (5 mg total) by mouth nightly as needed for Muscle spasms. Dispense: 10 tablet; Refill: 0  - CBC WITH DIFFERENTIAL WITH PLATELET;  Future  - COMP METABOLIC PANEL (14); Fut PLATELET  - COMP METABOLIC PANEL (14)  - LIPID PANEL  - HEMOGLOBIN A1C  - TSH W REFLEX TO FREE T4    12.  History of basal cell carcinoma of skin  Advised to follow up with dermatology   The patient indicates understanding of these issues and agrees to the performed in visit on 12/19/20   • PNEUMOCOCCAL VACC, 13 KARUNA IM    Update Immunization Activity if applicable    Hepatitis B No orders found for this or any previous visit.  Update Immunization Activity if applicable    Tetanus Orders placed or performed in Pap All Patients q2 year if indicated There are no preventive care reminders to display for this patient.    Mammogram Age > 44 q1 year Mammogram due on 06/12/2021   EKG All Patients One at initial exam 2019   Vaccines:      Pneumococcal All Patients Once

## 2020-12-26 ENCOUNTER — OFFICE VISIT (OUTPATIENT)
Dept: FAMILY MEDICINE CLINIC | Facility: CLINIC | Age: 65
End: 2020-12-26
Payer: MEDICARE

## 2020-12-26 VITALS
SYSTOLIC BLOOD PRESSURE: 128 MMHG | TEMPERATURE: 98 F | WEIGHT: 190 LBS | OXYGEN SATURATION: 97 % | HEART RATE: 79 BPM | BODY MASS INDEX: 32.44 KG/M2 | DIASTOLIC BLOOD PRESSURE: 60 MMHG | HEIGHT: 64 IN

## 2020-12-26 DIAGNOSIS — L03.113 CELLULITIS OF RIGHT UPPER EXTREMITY: Primary | ICD-10-CM

## 2020-12-26 PROCEDURE — 99213 OFFICE O/P EST LOW 20 MIN: CPT | Performed by: PHYSICIAN ASSISTANT

## 2020-12-26 RX ORDER — SULFAMETHOXAZOLE AND TRIMETHOPRIM 800; 160 MG/1; MG/1
1 TABLET ORAL 2 TIMES DAILY
Qty: 14 TABLET | Refills: 0 | Status: SHIPPED | OUTPATIENT
Start: 2020-12-26 | End: 2021-01-02

## 2020-12-26 NOTE — PATIENT INSTRUCTIONS
warm moist soaks to area four times daily  Complete entire course of antibiotics  Ibuprofen or acetaminophen for pain  Monitor for healing.   Return or follow-up with your PCP for increase in redness or pain, warmth, fever, red streaking, or increase in dis

## 2020-12-26 NOTE — PROGRESS NOTES
CHIEF COMPLAINT:   Patient presents with:  Reaction: after PNA injection x 1week, 10 in x 4.5 in     HPI:     Heather Alvarado is a 76year old female who presents with concerns of redness. Patient first noticed symptoms 1 week ago.   Reports erythema, inc not taking: Reported on 12/19/2020 ) 60 g 3      Past Medical History:   Diagnosis Date   • Basal cell carcinoma     wide excision, 2010   • Essential tremor     drug therapy   • Lipid screening 11/23/2013    per NG   • Osteoporosis screening 3/30/2011 lymphadenopathy. ASSESSMENT AND PLAN:     ASSESSMENT: Cellulitis of right upper extremity  (primary encounter diagnosis)    PLAN: Poss cellulitis vs allergic reaction.  Based on signs of increased redness that continues to spread and increased warmth t

## 2021-03-13 DIAGNOSIS — Z23 NEED FOR VACCINATION: ICD-10-CM

## 2021-06-30 ENCOUNTER — HOSPITAL ENCOUNTER (OUTPATIENT)
Dept: BONE DENSITY | Age: 66
Discharge: HOME | End: 2021-06-30
Attending: INTERNAL MEDICINE
Payer: MEDICARE

## 2021-06-30 DIAGNOSIS — I38 VALVULAR HEART DISEASE: ICD-10-CM

## 2021-06-30 DIAGNOSIS — M85.80 OSTEOPENIA, UNSPECIFIED LOCATION: ICD-10-CM

## 2021-06-30 DIAGNOSIS — Z23 NEED FOR VACCINATION: ICD-10-CM

## 2021-06-30 DIAGNOSIS — Z78.0 ASYMPTOMATIC MENOPAUSE: ICD-10-CM

## 2021-06-30 DIAGNOSIS — Z85.828 HISTORY OF BASAL CELL CARCINOMA OF SKIN: ICD-10-CM

## 2021-06-30 DIAGNOSIS — M79.601 RIGHT ARM PAIN: ICD-10-CM

## 2021-06-30 DIAGNOSIS — R73.03 PREDIABETES: ICD-10-CM

## 2021-06-30 DIAGNOSIS — E78.5 HYPERLIPIDEMIA, UNSPECIFIED HYPERLIPIDEMIA TYPE: ICD-10-CM

## 2021-06-30 DIAGNOSIS — Z00.00 MEDICARE ANNUAL WELLNESS VISIT, INITIAL: ICD-10-CM

## 2021-06-30 DIAGNOSIS — Z90.710 H/O: HYSTERECTOMY: ICD-10-CM

## 2021-06-30 DIAGNOSIS — Z12.31 ENCOUNTER FOR SCREENING MAMMOGRAM FOR MALIGNANT NEOPLASM OF BREAST: ICD-10-CM

## 2021-06-30 DIAGNOSIS — Q24.8 ABNORMALITY OF HEART VALVE: ICD-10-CM

## 2021-06-30 PROCEDURE — 77080 DXA BONE DENSITY AXIAL: CPT | Performed by: INTERNAL MEDICINE

## 2021-07-03 ENCOUNTER — HOSPITAL ENCOUNTER (OUTPATIENT)
Dept: MAMMOGRAPHY | Facility: HOSPITAL | Age: 66
Discharge: HOME | End: 2021-07-03
Attending: INTERNAL MEDICINE
Payer: MEDICARE

## 2021-07-03 DIAGNOSIS — Z90.710 H/O: HYSTERECTOMY: ICD-10-CM

## 2021-07-03 DIAGNOSIS — E78.5 HYPERLIPIDEMIA, UNSPECIFIED HYPERLIPIDEMIA TYPE: ICD-10-CM

## 2021-07-03 DIAGNOSIS — Z00.00 MEDICARE ANNUAL WELLNESS VISIT, INITIAL: ICD-10-CM

## 2021-07-03 DIAGNOSIS — Z85.828 HISTORY OF BASAL CELL CARCINOMA OF SKIN: ICD-10-CM

## 2021-07-03 DIAGNOSIS — M85.80 OSTEOPENIA, UNSPECIFIED LOCATION: ICD-10-CM

## 2021-07-03 DIAGNOSIS — Z12.31 ENCOUNTER FOR SCREENING MAMMOGRAM FOR MALIGNANT NEOPLASM OF BREAST: ICD-10-CM

## 2021-07-03 DIAGNOSIS — Z23 NEED FOR VACCINATION: ICD-10-CM

## 2021-07-03 DIAGNOSIS — I38 VALVULAR HEART DISEASE: ICD-10-CM

## 2021-07-03 DIAGNOSIS — Z78.0 ASYMPTOMATIC MENOPAUSE: ICD-10-CM

## 2021-07-03 DIAGNOSIS — Q24.8 ABNORMALITY OF HEART VALVE: ICD-10-CM

## 2021-07-03 DIAGNOSIS — M79.601 RIGHT ARM PAIN: ICD-10-CM

## 2021-07-03 DIAGNOSIS — R73.03 PREDIABETES: ICD-10-CM

## 2021-07-03 PROCEDURE — 77063 BREAST TOMOSYNTHESIS BI: CPT | Performed by: INTERNAL MEDICINE

## 2021-07-03 PROCEDURE — 77067 SCR MAMMO BI INCL CAD: CPT | Performed by: INTERNAL MEDICINE

## 2021-09-15 ENCOUNTER — OFFICE VISIT (OUTPATIENT)
Dept: DERMATOLOGY CLINIC | Facility: CLINIC | Age: 66
End: 2021-09-15
Payer: MEDICARE

## 2021-09-15 DIAGNOSIS — D22.9 MULTIPLE NEVI: ICD-10-CM

## 2021-09-15 DIAGNOSIS — D23.30 BENIGN NEOPLASM OF SKIN OF FACE: ICD-10-CM

## 2021-09-15 DIAGNOSIS — D23.5 BENIGN NEOPLASM OF SKIN OF TRUNK, EXCEPT SCROTUM: ICD-10-CM

## 2021-09-15 DIAGNOSIS — D23.4 BENIGN NEOPLASM OF SCALP AND SKIN OF NECK: ICD-10-CM

## 2021-09-15 DIAGNOSIS — D23.60 BENIGN NEOPLASM OF SKIN OF UPPER LIMB, INCLUDING SHOULDER, UNSPECIFIED LATERALITY: ICD-10-CM

## 2021-09-15 DIAGNOSIS — Z85.828 HISTORY OF NONMELANOMA SKIN CANCER: ICD-10-CM

## 2021-09-15 DIAGNOSIS — L82.0 INFLAMED SEBORRHEIC KERATOSIS: Primary | ICD-10-CM

## 2021-09-15 DIAGNOSIS — L82.1 SEBORRHEIC KERATOSES: ICD-10-CM

## 2021-09-15 PROCEDURE — 99213 OFFICE O/P EST LOW 20 MIN: CPT | Performed by: DERMATOLOGY

## 2021-09-19 DIAGNOSIS — T75.3XXA MOTION SICKNESS, INITIAL ENCOUNTER: ICD-10-CM

## 2021-09-19 DIAGNOSIS — Z23 NEED FOR VACCINATION: ICD-10-CM

## 2021-09-19 DIAGNOSIS — Z00.00 ANNUAL PHYSICAL EXAM: ICD-10-CM

## 2021-09-19 DIAGNOSIS — R73.01 IMPAIRED FASTING GLUCOSE: ICD-10-CM

## 2021-09-19 DIAGNOSIS — M85.80 OSTEOPENIA, UNSPECIFIED LOCATION: ICD-10-CM

## 2021-09-22 RX ORDER — MECLIZINE HYDROCHLORIDE 25 MG/1
25 TABLET ORAL 3 TIMES DAILY PRN
Qty: 30 TABLET | Refills: 1 | Status: SHIPPED | OUTPATIENT
Start: 2021-09-22

## 2021-09-26 NOTE — PROGRESS NOTES
Audrey Acuña is a 76year old female.   HPI:     CC:  Patient presents with:  Full Skin Exam: pt presents for full body exam, personal HX of BCC, denies family HX of skin cancer, LOV 5/13/2019        Allergies:  Amoxicillin    HISTORY:    Past Medical Hi • Essential tremor     drug therapy   • Lipid screening 11/23/2013    per NG   • Osteoporosis screening 3/30/2011    per NG   • Pelvic relaxation     [facility], 1999   • Skin cancer     BCC x 2 at forehead   • Unspecified essential hypertension     drug therap Not on file      Ran Out of Food in the Last Year: Not on file  Transportation Needs:       Lack of Transportation (Medical): Not on file      Lack of Transportation (Non-Medical):  Not on file  Physical Activity:       Days of Exercise per Week: Not on [place]. History, medications, allergies reviewed as noted. ROS:  Denies any other systemic complaints. No new or changeing lesions other than noted above. No fevers, chills, night sweats, unusual sun sensitivity. No other skin complaints. especially along the hairline. Continue careful sun protection precancerous nature discussed.   Sun damage along the anterior hairline forehead observe carefully    Multiple waxy tan keratotic papules at bilateral temples anterior scalp mid back, more infl

## 2021-12-23 DIAGNOSIS — E78.5 HYPERLIPIDEMIA, UNSPECIFIED HYPERLIPIDEMIA TYPE: ICD-10-CM

## 2021-12-23 DIAGNOSIS — M79.601 RIGHT ARM PAIN: ICD-10-CM

## 2021-12-23 DIAGNOSIS — I38 VALVULAR HEART DISEASE: ICD-10-CM

## 2021-12-23 DIAGNOSIS — Z12.31 ENCOUNTER FOR SCREENING MAMMOGRAM FOR MALIGNANT NEOPLASM OF BREAST: ICD-10-CM

## 2021-12-23 DIAGNOSIS — M85.80 OSTEOPENIA, UNSPECIFIED LOCATION: ICD-10-CM

## 2021-12-23 DIAGNOSIS — Q24.8 ABNORMALITY OF HEART VALVE: ICD-10-CM

## 2021-12-23 DIAGNOSIS — Z90.710 H/O: HYSTERECTOMY: ICD-10-CM

## 2021-12-23 DIAGNOSIS — Z23 NEED FOR VACCINATION: ICD-10-CM

## 2021-12-23 DIAGNOSIS — Z78.0 ASYMPTOMATIC MENOPAUSE: ICD-10-CM

## 2021-12-23 DIAGNOSIS — Z85.828 HISTORY OF BASAL CELL CARCINOMA OF SKIN: ICD-10-CM

## 2021-12-23 DIAGNOSIS — R73.03 PREDIABETES: ICD-10-CM

## 2021-12-23 DIAGNOSIS — Z00.00 MEDICARE ANNUAL WELLNESS VISIT, INITIAL: ICD-10-CM

## 2021-12-23 RX ORDER — LISINOPRIL 5 MG/1
5 TABLET ORAL DAILY
Qty: 90 TABLET | Refills: 0 | Status: SHIPPED | OUTPATIENT
Start: 2021-12-23 | End: 2022-10-05 | Stop reason: ALTCHOICE

## 2022-01-24 ENCOUNTER — HOSPITAL ENCOUNTER (OUTPATIENT)
Dept: ULTRASOUND IMAGING | Facility: HOSPITAL | Age: 67
Discharge: HOME | End: 2022-01-24
Attending: INTERNAL MEDICINE
Payer: MEDICARE

## 2022-01-24 DIAGNOSIS — R94.31 ABNORMAL EKG: ICD-10-CM

## 2022-01-24 DIAGNOSIS — I10 HYPERTENSION: ICD-10-CM

## 2022-01-24 PROCEDURE — 93880 EXTRACRANIAL BILAT STUDY: CPT | Performed by: INTERNAL MEDICINE

## 2022-04-23 ENCOUNTER — OFFICE VISIT (OUTPATIENT)
Dept: INTERNAL MEDICINE CLINIC | Facility: CLINIC | Age: 67
End: 2022-04-23
Payer: MEDICARE

## 2022-04-23 VITALS
SYSTOLIC BLOOD PRESSURE: 120 MMHG | BODY MASS INDEX: 31.24 KG/M2 | HEIGHT: 64 IN | DIASTOLIC BLOOD PRESSURE: 80 MMHG | WEIGHT: 183 LBS | HEART RATE: 57 BPM | OXYGEN SATURATION: 97 %

## 2022-04-23 DIAGNOSIS — I38 VALVULAR HEART DISEASE: ICD-10-CM

## 2022-04-23 DIAGNOSIS — M85.80 OSTEOPENIA, UNSPECIFIED LOCATION: ICD-10-CM

## 2022-04-23 DIAGNOSIS — Z85.828 HISTORY OF BASAL CELL CARCINOMA OF SKIN: ICD-10-CM

## 2022-04-23 DIAGNOSIS — Z12.31 ENCOUNTER FOR SCREENING MAMMOGRAM FOR MALIGNANT NEOPLASM OF BREAST: ICD-10-CM

## 2022-04-23 DIAGNOSIS — Z78.0 ASYMPTOMATIC MENOPAUSE: ICD-10-CM

## 2022-04-23 DIAGNOSIS — I35.1 AORTIC VALVE INSUFFICIENCY, ETIOLOGY OF CARDIAC VALVE DISEASE UNSPECIFIED: ICD-10-CM

## 2022-04-23 DIAGNOSIS — Z90.710 H/O: HYSTERECTOMY: ICD-10-CM

## 2022-04-23 DIAGNOSIS — Q24.8 ABNORMALITY OF HEART VALVE: ICD-10-CM

## 2022-04-23 DIAGNOSIS — Z00.00 MEDICARE ANNUAL WELLNESS VISIT, SUBSEQUENT: Primary | ICD-10-CM

## 2022-04-23 DIAGNOSIS — Z12.11 SCREENING FOR COLON CANCER: ICD-10-CM

## 2022-04-23 DIAGNOSIS — E78.5 HYPERLIPIDEMIA, UNSPECIFIED HYPERLIPIDEMIA TYPE: ICD-10-CM

## 2022-04-23 DIAGNOSIS — R73.03 PREDIABETES: ICD-10-CM

## 2022-04-23 DIAGNOSIS — Z00.00 ROUTINE GENERAL MEDICAL EXAMINATION AT A HEALTH CARE FACILITY: ICD-10-CM

## 2022-04-23 LAB
ALBUMIN SERPL-MCNC: 3.9 G/DL (ref 3.4–5)
ALBUMIN/GLOB SERPL: 1.1 {RATIO} (ref 1–2)
ALP LIVER SERPL-CCNC: 77 U/L
ALT SERPL-CCNC: 28 U/L
ANION GAP SERPL CALC-SCNC: 7 MMOL/L (ref 0–18)
AST SERPL-CCNC: 20 U/L (ref 15–37)
BASOPHILS # BLD AUTO: 0.03 X10(3) UL (ref 0–0.2)
BASOPHILS NFR BLD AUTO: 0.6 %
BILIRUB SERPL-MCNC: 0.5 MG/DL (ref 0.1–2)
BUN BLD-MCNC: 14 MG/DL (ref 7–18)
BUN/CREAT SERPL: 13.2 (ref 10–20)
CALCIUM BLD-MCNC: 9.2 MG/DL (ref 8.5–10.1)
CHLORIDE SERPL-SCNC: 106 MMOL/L (ref 98–112)
CHOLEST SERPL-MCNC: 186 MG/DL (ref ?–200)
CO2 SERPL-SCNC: 28 MMOL/L (ref 21–32)
CREAT BLD-MCNC: 1.06 MG/DL
DEPRECATED RDW RBC AUTO: 44.6 FL (ref 35.1–46.3)
EOSINOPHIL # BLD AUTO: 0.09 X10(3) UL (ref 0–0.7)
EOSINOPHIL NFR BLD AUTO: 1.7 %
ERYTHROCYTE [DISTWIDTH] IN BLOOD BY AUTOMATED COUNT: 12.7 % (ref 11–15)
EST. AVERAGE GLUCOSE BLD GHB EST-MCNC: 123 MG/DL (ref 68–126)
FASTING PATIENT LIPID ANSWER: YES
FASTING STATUS PATIENT QL REPORTED: YES
GLOBULIN PLAS-MCNC: 3.7 G/DL (ref 2.8–4.4)
GLUCOSE BLD-MCNC: 93 MG/DL (ref 70–99)
HBA1C MFR BLD: 5.9 % (ref ?–5.7)
HCT VFR BLD AUTO: 47.8 %
HDLC SERPL-MCNC: 52 MG/DL (ref 40–59)
HGB BLD-MCNC: 15.2 G/DL
IMM GRANULOCYTES # BLD AUTO: 0.01 X10(3) UL (ref 0–1)
IMM GRANULOCYTES NFR BLD: 0.2 %
LDLC SERPL CALC-MCNC: 121 MG/DL (ref ?–100)
LYMPHOCYTES # BLD AUTO: 1.53 X10(3) UL (ref 1–4)
LYMPHOCYTES NFR BLD AUTO: 28.2 %
MCH RBC QN AUTO: 30.3 PG (ref 26–34)
MCHC RBC AUTO-ENTMCNC: 31.8 G/DL (ref 31–37)
MCV RBC AUTO: 95.2 FL
MONOCYTES # BLD AUTO: 0.4 X10(3) UL (ref 0.1–1)
MONOCYTES NFR BLD AUTO: 7.4 %
NEUTROPHILS # BLD AUTO: 3.36 X10 (3) UL (ref 1.5–7.7)
NEUTROPHILS # BLD AUTO: 3.36 X10(3) UL (ref 1.5–7.7)
NEUTROPHILS NFR BLD AUTO: 61.9 %
NONHDLC SERPL-MCNC: 134 MG/DL (ref ?–130)
OSMOLALITY SERPL CALC.SUM OF ELEC: 292 MOSM/KG (ref 275–295)
PLATELET # BLD AUTO: 228 10(3)UL (ref 150–450)
POTASSIUM SERPL-SCNC: 4.2 MMOL/L (ref 3.5–5.1)
PROT SERPL-MCNC: 7.6 G/DL (ref 6.4–8.2)
RBC # BLD AUTO: 5.02 X10(6)UL
SODIUM SERPL-SCNC: 141 MMOL/L (ref 136–145)
TRIGL SERPL-MCNC: 70 MG/DL (ref 30–149)
TSI SER-ACNC: 1.39 MIU/ML (ref 0.36–3.74)
VLDLC SERPL CALC-MCNC: 12 MG/DL (ref 0–30)
WBC # BLD AUTO: 5.4 X10(3) UL (ref 4–11)

## 2022-04-23 PROCEDURE — 83036 HEMOGLOBIN GLYCOSYLATED A1C: CPT | Performed by: INTERNAL MEDICINE

## 2022-04-23 PROCEDURE — G0439 PPPS, SUBSEQ VISIT: HCPCS | Performed by: INTERNAL MEDICINE

## 2022-04-23 PROCEDURE — 90732 PPSV23 VACC 2 YRS+ SUBQ/IM: CPT | Performed by: INTERNAL MEDICINE

## 2022-04-23 PROCEDURE — 80053 COMPREHEN METABOLIC PANEL: CPT | Performed by: INTERNAL MEDICINE

## 2022-04-23 PROCEDURE — 85025 COMPLETE CBC W/AUTO DIFF WBC: CPT | Performed by: INTERNAL MEDICINE

## 2022-04-23 PROCEDURE — G0009 ADMIN PNEUMOCOCCAL VACCINE: HCPCS | Performed by: INTERNAL MEDICINE

## 2022-04-23 PROCEDURE — 84443 ASSAY THYROID STIM HORMONE: CPT | Performed by: INTERNAL MEDICINE

## 2022-04-23 PROCEDURE — 80061 LIPID PANEL: CPT | Performed by: INTERNAL MEDICINE

## 2022-04-23 RX ORDER — ALBUTEROL SULFATE 90 UG/1
2 AEROSOL, METERED RESPIRATORY (INHALATION) EVERY 6 HOURS PRN
Qty: 1 EACH | Refills: 1 | Status: SHIPPED | OUTPATIENT
Start: 2022-04-23

## 2022-04-24 PROBLEM — Q24.8: Status: ACTIVE | Noted: 2022-04-24

## 2022-04-24 PROBLEM — Q24.8 ABNORMALITY OF HEART VALVE: Status: ACTIVE | Noted: 2022-04-24

## 2022-07-09 ENCOUNTER — HOSPITAL ENCOUNTER (OUTPATIENT)
Dept: MAMMOGRAPHY | Facility: HOSPITAL | Age: 67
Discharge: HOME | End: 2022-07-09
Attending: INTERNAL MEDICINE
Payer: MEDICARE

## 2022-07-09 DIAGNOSIS — Q24.8 ABNORMALITY OF HEART VALVE: ICD-10-CM

## 2022-07-09 DIAGNOSIS — Z00.00 MEDICARE ANNUAL WELLNESS VISIT, SUBSEQUENT: ICD-10-CM

## 2022-07-09 DIAGNOSIS — M85.80 OSTEOPENIA, UNSPECIFIED LOCATION: ICD-10-CM

## 2022-07-09 DIAGNOSIS — I35.1 AORTIC VALVE INSUFFICIENCY, ETIOLOGY OF CARDIAC VALVE DISEASE UNSPECIFIED: ICD-10-CM

## 2022-07-09 DIAGNOSIS — Z78.0 ASYMPTOMATIC MENOPAUSE: ICD-10-CM

## 2022-07-09 DIAGNOSIS — Z12.11 SCREENING FOR COLON CANCER: ICD-10-CM

## 2022-07-09 DIAGNOSIS — Z12.31 ENCOUNTER FOR SCREENING MAMMOGRAM FOR MALIGNANT NEOPLASM OF BREAST: ICD-10-CM

## 2022-07-09 DIAGNOSIS — E78.5 HYPERLIPIDEMIA, UNSPECIFIED HYPERLIPIDEMIA TYPE: ICD-10-CM

## 2022-07-09 DIAGNOSIS — Z85.828 HISTORY OF BASAL CELL CARCINOMA OF SKIN: ICD-10-CM

## 2022-07-09 DIAGNOSIS — I38 VALVULAR HEART DISEASE: ICD-10-CM

## 2022-07-09 DIAGNOSIS — Z00.00 ROUTINE GENERAL MEDICAL EXAMINATION AT A HEALTH CARE FACILITY: ICD-10-CM

## 2022-07-09 DIAGNOSIS — R73.03 PREDIABETES: ICD-10-CM

## 2022-07-09 DIAGNOSIS — Z90.710 H/O: HYSTERECTOMY: ICD-10-CM

## 2022-07-09 PROCEDURE — 77063 BREAST TOMOSYNTHESIS BI: CPT | Performed by: INTERNAL MEDICINE

## 2022-07-09 PROCEDURE — 77067 SCR MAMMO BI INCL CAD: CPT | Performed by: INTERNAL MEDICINE

## 2022-08-01 ENCOUNTER — OFFICE VISIT (OUTPATIENT)
Dept: FAMILY MEDICINE CLINIC | Facility: CLINIC | Age: 67
End: 2022-08-01
Payer: MEDICARE

## 2022-08-01 VITALS
WEIGHT: 185 LBS | BODY MASS INDEX: 31.58 KG/M2 | DIASTOLIC BLOOD PRESSURE: 53 MMHG | SYSTOLIC BLOOD PRESSURE: 161 MMHG | OXYGEN SATURATION: 97 % | HEIGHT: 64 IN | RESPIRATION RATE: 18 BRPM | HEART RATE: 55 BPM | TEMPERATURE: 97 F

## 2022-08-01 DIAGNOSIS — N30.01 ACUTE CYSTITIS WITH HEMATURIA: Primary | ICD-10-CM

## 2022-08-01 DIAGNOSIS — R39.9 UTI SYMPTOMS: ICD-10-CM

## 2022-08-01 LAB
APPEARANCE: CLEAR
BILIRUBIN: NEGATIVE
GLUCOSE (URINE DIPSTICK): NEGATIVE MG/DL
KETONES (URINE DIPSTICK): NEGATIVE MG/DL
MULTISTIX LOT#: ABNORMAL NUMERIC
NITRITE, URINE: NEGATIVE
PH, URINE: 6 (ref 4.5–8)
PROTEIN (URINE DIPSTICK): NEGATIVE MG/DL
SPECIFIC GRAVITY: 1.01 (ref 1–1.03)
URINE-COLOR: YELLOW
UROBILINOGEN,SEMI-QN: 0.2 MG/DL (ref 0–1.9)

## 2022-08-01 PROCEDURE — 87086 URINE CULTURE/COLONY COUNT: CPT | Performed by: NURSE PRACTITIONER

## 2022-08-01 PROCEDURE — 87186 SC STD MICRODIL/AGAR DIL: CPT | Performed by: NURSE PRACTITIONER

## 2022-08-01 PROCEDURE — 87077 CULTURE AEROBIC IDENTIFY: CPT | Performed by: NURSE PRACTITIONER

## 2022-08-01 RX ORDER — CIPROFLOXACIN 250 MG/1
250 TABLET, FILM COATED ORAL 2 TIMES DAILY
Qty: 10 TABLET | Refills: 0 | Status: SHIPPED | OUTPATIENT
Start: 2022-08-01 | End: 2022-08-06

## 2022-10-05 ENCOUNTER — OFFICE VISIT (OUTPATIENT)
Dept: INTERNAL MEDICINE CLINIC | Facility: CLINIC | Age: 67
End: 2022-10-05
Payer: MEDICARE

## 2022-10-05 VITALS
SYSTOLIC BLOOD PRESSURE: 138 MMHG | RESPIRATION RATE: 17 BRPM | WEIGHT: 185 LBS | DIASTOLIC BLOOD PRESSURE: 84 MMHG | HEIGHT: 64 IN | BODY MASS INDEX: 31.58 KG/M2 | HEART RATE: 58 BPM | OXYGEN SATURATION: 98 %

## 2022-10-05 DIAGNOSIS — Z51.81 THERAPEUTIC DRUG MONITORING: ICD-10-CM

## 2022-10-05 DIAGNOSIS — N17.9 AKI (ACUTE KIDNEY INJURY) (HCC): ICD-10-CM

## 2022-10-05 DIAGNOSIS — M54.9 BACK PAIN, UNSPECIFIED BACK LOCATION, UNSPECIFIED BACK PAIN LATERALITY, UNSPECIFIED CHRONICITY: Primary | ICD-10-CM

## 2022-10-05 DIAGNOSIS — R73.03 PREDIABETES: ICD-10-CM

## 2022-10-05 DIAGNOSIS — R73.9 HYPERGLYCEMIA: ICD-10-CM

## 2022-10-05 DIAGNOSIS — Z12.11 SCREENING FOR COLON CANCER: ICD-10-CM

## 2022-10-05 LAB
ANION GAP SERPL CALC-SCNC: 4 MMOL/L (ref 0–18)
BUN BLD-MCNC: 16 MG/DL (ref 7–18)
BUN/CREAT SERPL: 15.8 (ref 10–20)
CALCIUM BLD-MCNC: 8.7 MG/DL (ref 8.5–10.1)
CHLORIDE SERPL-SCNC: 110 MMOL/L (ref 98–112)
CO2 SERPL-SCNC: 26 MMOL/L (ref 21–32)
CREAT BLD-MCNC: 1.01 MG/DL
EST. AVERAGE GLUCOSE BLD GHB EST-MCNC: 117 MG/DL (ref 68–126)
FASTING STATUS PATIENT QL REPORTED: NO
GFR SERPLBLD BASED ON 1.73 SQ M-ARVRAT: 61 ML/MIN/1.73M2 (ref 60–?)
GLUCOSE BLD-MCNC: 106 MG/DL (ref 70–99)
HBA1C MFR BLD: 5.7 % (ref ?–5.7)
OSMOLALITY SERPL CALC.SUM OF ELEC: 292 MOSM/KG (ref 275–295)
POTASSIUM SERPL-SCNC: 4.5 MMOL/L (ref 3.5–5.1)
SODIUM SERPL-SCNC: 140 MMOL/L (ref 136–145)

## 2022-10-05 PROCEDURE — 99214 OFFICE O/P EST MOD 30 MIN: CPT | Performed by: INTERNAL MEDICINE

## 2022-10-05 PROCEDURE — 83036 HEMOGLOBIN GLYCOSYLATED A1C: CPT | Performed by: INTERNAL MEDICINE

## 2022-10-05 PROCEDURE — 80048 BASIC METABOLIC PNL TOTAL CA: CPT | Performed by: INTERNAL MEDICINE

## 2022-10-05 PROCEDURE — 36415 COLL VENOUS BLD VENIPUNCTURE: CPT | Performed by: INTERNAL MEDICINE

## 2022-10-05 RX ORDER — METHYLPREDNISOLONE 4 MG/1
TABLET ORAL
Qty: 21 EACH | Refills: 0 | Status: SHIPPED | OUTPATIENT
Start: 2022-10-05

## 2022-10-05 RX ORDER — CYCLOBENZAPRINE HCL 5 MG
5 TABLET ORAL NIGHTLY PRN
Qty: 10 TABLET | Refills: 0 | Status: SHIPPED | OUTPATIENT
Start: 2022-10-05

## 2022-12-08 ENCOUNTER — OFFICE VISIT (OUTPATIENT)
Dept: DERMATOLOGY CLINIC | Facility: CLINIC | Age: 67
End: 2022-12-08
Payer: MEDICARE

## 2022-12-08 DIAGNOSIS — D23.5 BENIGN NEOPLASM OF SKIN OF TRUNK: ICD-10-CM

## 2022-12-08 DIAGNOSIS — D22.9 MULTIPLE NEVI: ICD-10-CM

## 2022-12-08 DIAGNOSIS — L82.0 INFLAMED SEBORRHEIC KERATOSIS: Primary | ICD-10-CM

## 2022-12-08 DIAGNOSIS — L82.1 SEBORRHEIC KERATOSES: ICD-10-CM

## 2022-12-08 DIAGNOSIS — D23.60 BENIGN NEOPLASM OF SKIN OF UPPER LIMB, INCLUDING SHOULDER, UNSPECIFIED LATERALITY: ICD-10-CM

## 2022-12-08 DIAGNOSIS — D23.70 BENIGN NEOPLASM OF SKIN OF LOWER LIMB, INCLUDING HIP, UNSPECIFIED LATERALITY: ICD-10-CM

## 2022-12-08 DIAGNOSIS — Z85.828 HISTORY OF NONMELANOMA SKIN CANCER: ICD-10-CM

## 2022-12-08 DIAGNOSIS — D23.30 BENIGN NEOPLASM OF SKIN OF FACE: ICD-10-CM

## 2022-12-08 DIAGNOSIS — D23.4 BENIGN NEOPLASM OF SCALP AND SKIN OF NECK: ICD-10-CM

## 2022-12-08 PROCEDURE — 99213 OFFICE O/P EST LOW 20 MIN: CPT | Performed by: DERMATOLOGY

## 2023-01-23 ENCOUNTER — OFFICE VISIT (OUTPATIENT)
Facility: CLINIC | Age: 68
End: 2023-01-23

## 2023-01-23 ENCOUNTER — TELEPHONE (OUTPATIENT)
Facility: CLINIC | Age: 68
End: 2023-01-23

## 2023-01-23 VITALS
WEIGHT: 190.81 LBS | HEART RATE: 52 BPM | BODY MASS INDEX: 33 KG/M2 | SYSTOLIC BLOOD PRESSURE: 149 MMHG | DIASTOLIC BLOOD PRESSURE: 82 MMHG

## 2023-01-23 DIAGNOSIS — Z12.11 SCREEN FOR COLON CANCER: Primary | ICD-10-CM

## 2023-01-23 DIAGNOSIS — Z12.11 COLON CANCER SCREENING: Primary | ICD-10-CM

## 2023-01-23 RX ORDER — POLYETHYLENE GLYCOL 3350, SODIUM CHLORIDE, SODIUM BICARBONATE, POTASSIUM CHLORIDE 420; 11.2; 5.72; 1.48 G/4L; G/4L; G/4L; G/4L
POWDER, FOR SOLUTION ORAL
Qty: 4000 ML | Refills: 0 | Status: SHIPPED | OUTPATIENT
Start: 2023-01-23

## 2023-01-24 NOTE — TELEPHONE ENCOUNTER
Scheduled for:  Colonoscopy 38119  Provider Name:  Dr Micheline Collins  Date:  04/07/2023  Location:  [facility]  Sedation:  MAC  Time:  11:15am ( Patient is aware arrival time is at 10:15am)  Prep:  Trilyte   Meds/Allergies Reconciled?:  Salinas Banks NP, Reviewed   Diagnosis with codes:  Colon Screening Z12.11  Was patient informed to call insurance with codes (Y/N):  Yes   Referral sent?:  Referral was sent at the time of electronic surgical scheduling. [facility] or [id number] notified?:  I sent an electronic request to Endo Scheduling and received a confirmation today. Medication Orders: HOLD Lisinopril the day of the procedure. Pt is aware to NOT take iron pills, herbal meds and diet supplements for 7 days before exam. Also to NOT take any form of alcohol, recreational drugs and any forms of ED meds 24 hours before exam.     Misc Orders:       Further instructions given by staff: I provide prep instructions to patient at the time of the appointment and reviewed date, time and location, she verbalized that she understood and is aware to call if she  has any questions.

## 2023-04-07 ENCOUNTER — ANESTHESIA EVENT (OUTPATIENT)
Dept: ENDOSCOPY | Age: 68
End: 2023-04-07
Payer: MEDICARE

## 2023-04-07 ENCOUNTER — HOSPITAL ENCOUNTER (OUTPATIENT)
Age: 68
Setting detail: HOSPITAL OUTPATIENT SURGERY
Discharge: HOME | End: 2023-04-07
Attending: INTERNAL MEDICINE | Admitting: INTERNAL MEDICINE
Payer: MEDICARE

## 2023-04-07 ENCOUNTER — ANESTHESIA (OUTPATIENT)
Dept: ENDOSCOPY | Age: 68
End: 2023-04-07
Payer: MEDICARE

## 2023-04-07 VITALS
OXYGEN SATURATION: 97 % | SYSTOLIC BLOOD PRESSURE: 115 MMHG | RESPIRATION RATE: 17 BRPM | HEART RATE: 71 BPM | TEMPERATURE: 98 F | DIASTOLIC BLOOD PRESSURE: 52 MMHG | HEIGHT: 64 IN | BODY MASS INDEX: 32.44 KG/M2 | WEIGHT: 190 LBS

## 2023-04-07 DIAGNOSIS — Z12.11 SCREEN FOR COLON CANCER: ICD-10-CM

## 2023-04-07 PROCEDURE — 45385 COLONOSCOPY W/LESION REMOVAL: CPT | Performed by: INTERNAL MEDICINE

## 2023-04-07 PROCEDURE — 88305 TISSUE EXAM BY PATHOLOGIST: CPT | Performed by: INTERNAL MEDICINE

## 2023-04-07 PROCEDURE — 99070 SPECIAL SUPPLIES PHYS/QHP: CPT | Performed by: INTERNAL MEDICINE

## 2023-04-07 RX ORDER — LIDOCAINE HYDROCHLORIDE 10 MG/ML
INJECTION, SOLUTION EPIDURAL; INFILTRATION; INTRACAUDAL; PERINEURAL AS NEEDED
Status: DISCONTINUED | OUTPATIENT
Start: 2023-04-07 | End: 2023-04-07 | Stop reason: SURG

## 2023-04-07 RX ORDER — SODIUM CHLORIDE, SODIUM LACTATE, POTASSIUM CHLORIDE, CALCIUM CHLORIDE 600; 310; 30; 20 MG/100ML; MG/100ML; MG/100ML; MG/100ML
INJECTION, SOLUTION INTRAVENOUS CONTINUOUS
Status: DISCONTINUED | OUTPATIENT
Start: 2023-04-07 | End: 2023-04-07

## 2023-04-07 RX ADMIN — LIDOCAINE HYDROCHLORIDE 50 MG: 10 INJECTION, SOLUTION EPIDURAL; INFILTRATION; INTRACAUDAL; PERINEURAL at 11:01:00

## 2023-04-07 NOTE — DISCHARGE INSTRUCTIONS

## 2023-04-07 NOTE — OPERATIVE REPORT
[facility] Endoscopy Report      Preoperative Diagnosis:  - colon cancer screening      Postoperative Diagnosis:  - colon polyps x 3  - diverticulosis  - small internal hemorrhoids      Procedure:    Colonoscopy       Surgeon:  Kristen Miner M.D. Anesthesia:  MAC    Technique:  After informed consent, the patient was placed in the left lateral recumbent position. Digital rectal examination revealed no palpable intraluminal abnormalities. An Olympus variable stiffness 190 series HD colonoscope was inserted into the rectum and advanced under direct vision by following the lumen to the cecum. The colon was examined upon withdrawal in the left lateral position. The procedure was well tolerated without immediate complication. Findings:  The preparation of the colon was good. The terminal ileum was examined for 4 cm and visually normal.  The ileocecal valve was well preserved. The visualized colonic mucosa from the cecum to the anal verge was normal with an intact vascular pattern. Colon polyps x3 removed as follows;  -Cecum x1, sessile 3 to 4 mm in size and cold snare removed. -Sigmoid x2, the first polyp was sessile 3 mm in size and cold snare removed. Second polyp was diminutive removed by cold forceps technique. All polypectomy sites inspected and found to be free of bleeding. Specimens retrieved and sent for analysis. Diverticulosis located in the sigmoid colon, no diverticulitis. Small internal hemorrhoids noted on retroflexed view. Estimated blood loss-insignificant  Specimens-colon polyps    Impression:  - colon polyps x 3  - diverticulosis  - small internal hemorrhoids    Recommendations:  - Post polypectomy instructions given  - Repeat colonoscopy in 3- 7 years  - High fiber diet for diverticular disease  - Symptomatic treatment of hemorrhoids          Robina Jarrell MD  4/7/2023  11:29 AM

## 2023-04-12 ENCOUNTER — TELEPHONE (OUTPATIENT)
Facility: CLINIC | Age: 68
End: 2023-04-12

## 2023-08-26 ENCOUNTER — OFFICE VISIT (OUTPATIENT)
Dept: INTERNAL MEDICINE CLINIC | Facility: CLINIC | Age: 68
End: 2023-08-26
Payer: MEDICARE

## 2023-08-26 VITALS
DIASTOLIC BLOOD PRESSURE: 72 MMHG | SYSTOLIC BLOOD PRESSURE: 122 MMHG | HEART RATE: 50 BPM | HEIGHT: 64 IN | WEIGHT: 182.81 LBS | OXYGEN SATURATION: 98 % | BODY MASS INDEX: 31.21 KG/M2

## 2023-08-26 DIAGNOSIS — R73.03 PREDIABETES: ICD-10-CM

## 2023-08-26 DIAGNOSIS — Z12.31 ENCOUNTER FOR SCREENING MAMMOGRAM FOR MALIGNANT NEOPLASM OF BREAST: ICD-10-CM

## 2023-08-26 DIAGNOSIS — M85.80 OSTEOPENIA, UNSPECIFIED LOCATION: ICD-10-CM

## 2023-08-26 DIAGNOSIS — Z85.828 HISTORY OF BASAL CELL CARCINOMA OF SKIN: ICD-10-CM

## 2023-08-26 DIAGNOSIS — R10.9 ABDOMINAL PAIN, UNSPECIFIED ABDOMINAL LOCATION: ICD-10-CM

## 2023-08-26 DIAGNOSIS — Z90.710 H/O: HYSTERECTOMY: ICD-10-CM

## 2023-08-26 DIAGNOSIS — Q24.8 ABNORMALITY OF HEART VALVE: ICD-10-CM

## 2023-08-26 DIAGNOSIS — E78.5 HYPERLIPIDEMIA, UNSPECIFIED HYPERLIPIDEMIA TYPE: ICD-10-CM

## 2023-08-26 DIAGNOSIS — Z00.00 ROUTINE GENERAL MEDICAL EXAMINATION AT A HEALTH CARE FACILITY: ICD-10-CM

## 2023-08-26 DIAGNOSIS — Z00.00 MEDICARE ANNUAL WELLNESS VISIT, SUBSEQUENT: Primary | ICD-10-CM

## 2023-08-26 DIAGNOSIS — I35.1 AORTIC VALVE INSUFFICIENCY, ETIOLOGY OF CARDIAC VALVE DISEASE UNSPECIFIED: ICD-10-CM

## 2023-08-26 DIAGNOSIS — I38 VALVULAR HEART DISEASE: ICD-10-CM

## 2023-08-26 DIAGNOSIS — Z78.0 ASYMPTOMATIC MENOPAUSE: ICD-10-CM

## 2023-08-26 PROCEDURE — G0439 PPPS, SUBSEQ VISIT: HCPCS | Performed by: INTERNAL MEDICINE

## 2023-08-26 PROCEDURE — 1126F AMNT PAIN NOTED NONE PRSNT: CPT | Performed by: INTERNAL MEDICINE

## 2023-08-26 PROCEDURE — 99214 OFFICE O/P EST MOD 30 MIN: CPT | Performed by: INTERNAL MEDICINE

## 2023-08-27 ENCOUNTER — LAB ENCOUNTER (OUTPATIENT)
Dept: LAB | Facility: HOSPITAL | Age: 68
End: 2023-08-27
Attending: INTERNAL MEDICINE
Payer: MEDICARE

## 2023-08-27 DIAGNOSIS — Q24.8 ABNORMALITY OF HEART VALVE: ICD-10-CM

## 2023-08-27 DIAGNOSIS — Z00.00 MEDICARE ANNUAL WELLNESS VISIT, SUBSEQUENT: ICD-10-CM

## 2023-08-27 DIAGNOSIS — Z85.828 HISTORY OF BASAL CELL CARCINOMA OF SKIN: ICD-10-CM

## 2023-08-27 DIAGNOSIS — Z00.00 ROUTINE GENERAL MEDICAL EXAMINATION AT A HEALTH CARE FACILITY: ICD-10-CM

## 2023-08-27 DIAGNOSIS — Z90.710 H/O: HYSTERECTOMY: ICD-10-CM

## 2023-08-27 DIAGNOSIS — Z12.31 ENCOUNTER FOR SCREENING MAMMOGRAM FOR MALIGNANT NEOPLASM OF BREAST: ICD-10-CM

## 2023-08-27 DIAGNOSIS — M85.80 OSTEOPENIA, UNSPECIFIED LOCATION: ICD-10-CM

## 2023-08-27 DIAGNOSIS — R10.9 ABDOMINAL PAIN, UNSPECIFIED ABDOMINAL LOCATION: ICD-10-CM

## 2023-08-27 DIAGNOSIS — Z78.0 ASYMPTOMATIC MENOPAUSE: ICD-10-CM

## 2023-08-27 DIAGNOSIS — I35.1 AORTIC VALVE INSUFFICIENCY, ETIOLOGY OF CARDIAC VALVE DISEASE UNSPECIFIED: ICD-10-CM

## 2023-08-27 DIAGNOSIS — I38 VALVULAR HEART DISEASE: ICD-10-CM

## 2023-08-27 DIAGNOSIS — E78.5 HYPERLIPIDEMIA, UNSPECIFIED HYPERLIPIDEMIA TYPE: ICD-10-CM

## 2023-08-27 DIAGNOSIS — R73.03 PREDIABETES: ICD-10-CM

## 2023-08-27 LAB
ALBUMIN SERPL-MCNC: 3.7 G/DL (ref 3.4–5)
ALBUMIN/GLOB SERPL: 1.1 {RATIO} (ref 1–2)
ALP LIVER SERPL-CCNC: 78 U/L
ALT SERPL-CCNC: 30 U/L
AMYLASE SERPL-CCNC: 50 U/L (ref 25–115)
ANION GAP SERPL CALC-SCNC: 15 MMOL/L (ref 0–18)
AST SERPL-CCNC: 16 U/L (ref 15–37)
BASOPHILS # BLD AUTO: 0.02 X10(3) UL (ref 0–0.2)
BASOPHILS NFR BLD AUTO: 0.4 %
BILIRUB SERPL-MCNC: 0.6 MG/DL (ref 0.1–2)
BILIRUB UR QL: NEGATIVE
BUN BLD-MCNC: 16 MG/DL (ref 7–18)
BUN/CREAT SERPL: 15.1 (ref 10–20)
CALCIUM BLD-MCNC: 8.8 MG/DL (ref 8.5–10.1)
CHLORIDE SERPL-SCNC: 110 MMOL/L (ref 98–112)
CHOLEST SERPL-MCNC: 189 MG/DL (ref ?–200)
CLARITY UR: CLEAR
CO2 SERPL-SCNC: 15 MMOL/L (ref 21–32)
COLOR UR: YELLOW
CREAT BLD-MCNC: 1.06 MG/DL
DEPRECATED RDW RBC AUTO: 43.6 FL (ref 35.1–46.3)
EGFRCR SERPLBLD CKD-EPI 2021: 57 ML/MIN/1.73M2 (ref 60–?)
EOSINOPHIL # BLD AUTO: 0.12 X10(3) UL (ref 0–0.7)
EOSINOPHIL NFR BLD AUTO: 2.5 %
ERYTHROCYTE [DISTWIDTH] IN BLOOD BY AUTOMATED COUNT: 12.6 % (ref 11–15)
EST. AVERAGE GLUCOSE BLD GHB EST-MCNC: 117 MG/DL (ref 68–126)
FASTING PATIENT LIPID ANSWER: YES
FASTING STATUS PATIENT QL REPORTED: YES
GLOBULIN PLAS-MCNC: 3.3 G/DL (ref 2.8–4.4)
GLUCOSE BLD-MCNC: 111 MG/DL (ref 70–99)
GLUCOSE UR-MCNC: NORMAL MG/DL
HBA1C MFR BLD: 5.7 % (ref ?–5.7)
HCT VFR BLD AUTO: 44.1 %
HDLC SERPL-MCNC: 53 MG/DL (ref 40–59)
HGB BLD-MCNC: 14.6 G/DL
HGB UR QL STRIP.AUTO: NEGATIVE
IMM GRANULOCYTES # BLD AUTO: 0 X10(3) UL (ref 0–1)
IMM GRANULOCYTES NFR BLD: 0 %
LDLC SERPL CALC-MCNC: 120 MG/DL (ref ?–100)
LEUKOCYTE ESTERASE UR QL STRIP.AUTO: 250
LIPASE SERPL-CCNC: 42 U/L (ref 13–75)
LYMPHOCYTES # BLD AUTO: 1.62 X10(3) UL (ref 1–4)
LYMPHOCYTES NFR BLD AUTO: 33.8 %
MCH RBC QN AUTO: 31 PG (ref 26–34)
MCHC RBC AUTO-ENTMCNC: 33.1 G/DL (ref 31–37)
MCV RBC AUTO: 93.6 FL
MONOCYTES # BLD AUTO: 0.38 X10(3) UL (ref 0.1–1)
MONOCYTES NFR BLD AUTO: 7.9 %
NEUTROPHILS # BLD AUTO: 2.65 X10 (3) UL (ref 1.5–7.7)
NEUTROPHILS # BLD AUTO: 2.65 X10(3) UL (ref 1.5–7.7)
NEUTROPHILS NFR BLD AUTO: 55.4 %
NITRITE UR QL STRIP.AUTO: NEGATIVE
NONHDLC SERPL-MCNC: 136 MG/DL (ref ?–130)
OSMOLALITY SERPL CALC.SUM OF ELEC: 292 MOSM/KG (ref 275–295)
PH UR: 5 [PH] (ref 5–8)
PLATELET # BLD AUTO: 175 10(3)UL (ref 150–450)
POTASSIUM SERPL-SCNC: 4.2 MMOL/L (ref 3.5–5.1)
PROT SERPL-MCNC: 7 G/DL (ref 6.4–8.2)
PROT UR-MCNC: NEGATIVE MG/DL
RBC # BLD AUTO: 4.71 X10(6)UL
SODIUM SERPL-SCNC: 140 MMOL/L (ref 136–145)
SP GR UR STRIP: 1.02 (ref 1–1.03)
TRIGL SERPL-MCNC: 89 MG/DL (ref 30–149)
TSI SER-ACNC: 1.93 MIU/ML (ref 0.36–3.74)
UROBILINOGEN UR STRIP-ACNC: NORMAL
VIT D+METAB SERPL-MCNC: 23.3 NG/ML (ref 30–100)
VLDLC SERPL CALC-MCNC: 16 MG/DL (ref 0–30)
WBC # BLD AUTO: 4.8 X10(3) UL (ref 4–11)

## 2023-08-27 PROCEDURE — 84443 ASSAY THYROID STIM HORMONE: CPT

## 2023-08-27 PROCEDURE — 80053 COMPREHEN METABOLIC PANEL: CPT

## 2023-08-27 PROCEDURE — 82150 ASSAY OF AMYLASE: CPT

## 2023-08-27 PROCEDURE — 80061 LIPID PANEL: CPT

## 2023-08-27 PROCEDURE — 85025 COMPLETE CBC W/AUTO DIFF WBC: CPT

## 2023-08-27 PROCEDURE — 82306 VITAMIN D 25 HYDROXY: CPT

## 2023-08-27 PROCEDURE — 81001 URINALYSIS AUTO W/SCOPE: CPT

## 2023-08-27 PROCEDURE — 87086 URINE CULTURE/COLONY COUNT: CPT

## 2023-08-27 PROCEDURE — 87338 HPYLORI STOOL AG IA: CPT

## 2023-08-27 PROCEDURE — 83690 ASSAY OF LIPASE: CPT

## 2023-08-27 PROCEDURE — 83036 HEMOGLOBIN GLYCOSYLATED A1C: CPT

## 2023-08-27 PROCEDURE — 36415 COLL VENOUS BLD VENIPUNCTURE: CPT

## 2023-08-28 DIAGNOSIS — R82.90 ABNORMAL URINALYSIS: Primary | ICD-10-CM

## 2023-08-28 DIAGNOSIS — N17.9 AKI (ACUTE KIDNEY INJURY) (HCC): ICD-10-CM

## 2023-08-31 ENCOUNTER — TELEPHONE (OUTPATIENT)
Dept: INTERNAL MEDICINE CLINIC | Facility: CLINIC | Age: 68
End: 2023-08-31

## 2023-08-31 LAB — H PYLORI AG STL QL IA: NEGATIVE

## 2023-09-01 ENCOUNTER — HOSPITAL ENCOUNTER (OUTPATIENT)
Dept: CT IMAGING | Age: 68
Discharge: HOME | End: 2023-09-01
Attending: INTERNAL MEDICINE
Payer: MEDICARE

## 2023-09-01 DIAGNOSIS — I38 VALVULAR HEART DISEASE: ICD-10-CM

## 2023-09-01 DIAGNOSIS — Z12.31 ENCOUNTER FOR SCREENING MAMMOGRAM FOR MALIGNANT NEOPLASM OF BREAST: ICD-10-CM

## 2023-09-01 DIAGNOSIS — I35.1 AORTIC VALVE INSUFFICIENCY, ETIOLOGY OF CARDIAC VALVE DISEASE UNSPECIFIED: ICD-10-CM

## 2023-09-01 DIAGNOSIS — Z85.828 HISTORY OF BASAL CELL CARCINOMA OF SKIN: ICD-10-CM

## 2023-09-01 DIAGNOSIS — Z78.0 ASYMPTOMATIC MENOPAUSE: ICD-10-CM

## 2023-09-01 DIAGNOSIS — Z00.00 ROUTINE GENERAL MEDICAL EXAMINATION AT A HEALTH CARE FACILITY: ICD-10-CM

## 2023-09-01 DIAGNOSIS — Z00.00 MEDICARE ANNUAL WELLNESS VISIT, SUBSEQUENT: ICD-10-CM

## 2023-09-01 DIAGNOSIS — R73.03 PREDIABETES: ICD-10-CM

## 2023-09-01 DIAGNOSIS — E78.5 HYPERLIPIDEMIA, UNSPECIFIED HYPERLIPIDEMIA TYPE: ICD-10-CM

## 2023-09-01 DIAGNOSIS — M85.80 OSTEOPENIA, UNSPECIFIED LOCATION: ICD-10-CM

## 2023-09-01 DIAGNOSIS — R10.9 ABDOMINAL PAIN, UNSPECIFIED ABDOMINAL LOCATION: ICD-10-CM

## 2023-09-01 DIAGNOSIS — Z90.710 H/O: HYSTERECTOMY: ICD-10-CM

## 2023-09-01 DIAGNOSIS — Q24.8 ABNORMALITY OF HEART VALVE: ICD-10-CM

## 2023-09-01 PROCEDURE — 74178 CT ABD&PLV WO CNTR FLWD CNTR: CPT | Performed by: INTERNAL MEDICINE

## 2023-09-01 RX ORDER — ERGOCALCIFEROL 1.25 MG/1
50000 CAPSULE ORAL WEEKLY
Qty: 12 CAPSULE | Refills: 1 | Status: SHIPPED | OUTPATIENT
Start: 2023-09-01

## 2023-09-06 DIAGNOSIS — K76.9 LIVER LESION: ICD-10-CM

## 2023-09-06 DIAGNOSIS — R10.9 ABDOMINAL PAIN, UNSPECIFIED ABDOMINAL LOCATION: Primary | ICD-10-CM

## 2023-09-07 ENCOUNTER — TELEPHONE (OUTPATIENT)
Facility: CLINIC | Age: 68
End: 2023-09-07

## 2023-09-07 DIAGNOSIS — R14.0 BLOATING: ICD-10-CM

## 2023-09-07 DIAGNOSIS — R12 HEARTBURN: ICD-10-CM

## 2023-09-07 DIAGNOSIS — K76.9 LIVER LESION: Primary | ICD-10-CM

## 2023-09-07 NOTE — TELEPHONE ENCOUNTER
Ct A/P 9/1/23:  Ordered for non-specific abd pain -upper and lower abd pain. Impression   CONCLUSION:  1. No acute intra-abdominal finding. 2. Findings that can be seen in the setting of constipation. 3. Well-circumscribed 1.5 cm subcapsular enhancing focus at the inferior tip of the right hepatic lobe. This is incompletely characterized, but statistically relates to a benign flash filling hemangioma or perfusion variant. 4. Additional well-circumscribed subcentimeter low-density focus in the spleen, which statistically represents a benign cyst or hemangioma. 5. Hysterectomy. 6. Punctate focus of non dependent gas in the urinary bladder, which presumably relates to recent instrumentation. 7. Tiny retrocardiac hiatal hernia. 8. Lesser incidental findings as above.        elm-remote     Dictated by (CST): Cony Kraft MD on 9/01/2023 at 3:33 PM        LFTs normal 8/27/23. She says has daily bm. Feels full after eating. Has mild bloating at times. Has had reflux x last week. Felt it last night when going to bed. No n/v. Weight is stable. No fhx gi malignancy. Has not had egd.     Plan:  Mri abdomen  Hp testing  Start miralax one capful daily and titrate to desired effect  Reflux diet modification  Pepcid 40 mg bid  F/u in clinic to determine need for egd    Pt scheduled for 9/18 at 2 PM    She verbalizes understanding and is in agreement with the plan

## 2023-09-07 NOTE — TELEPHONE ENCOUNTER
Patient calling to schedule appoint due to having a CT scan and found a liver lesion, states if needed to be seen sooner based on findings. Scheduled patient for 12/13/23 and added to wait list,  please call.

## 2023-09-14 ENCOUNTER — LAB ENCOUNTER (OUTPATIENT)
Dept: LAB | Age: 68
End: 2023-09-14
Attending: NURSE PRACTITIONER
Payer: MEDICARE

## 2023-09-14 DIAGNOSIS — R12 HEARTBURN: ICD-10-CM

## 2023-09-14 DIAGNOSIS — R14.0 BLOATING: ICD-10-CM

## 2023-09-14 PROCEDURE — 83013 H PYLORI (C-13) BREATH: CPT

## 2023-09-16 LAB — H PYLORI BREATH TEST: NEGATIVE

## 2023-09-18 ENCOUNTER — OFFICE VISIT (OUTPATIENT)
Facility: CLINIC | Age: 68
End: 2023-09-18

## 2023-09-18 ENCOUNTER — LAB ENCOUNTER (OUTPATIENT)
Dept: LAB | Facility: HOSPITAL | Age: 68
End: 2023-09-18
Attending: NURSE PRACTITIONER
Payer: MEDICARE

## 2023-09-18 ENCOUNTER — TELEPHONE (OUTPATIENT)
Facility: CLINIC | Age: 68
End: 2023-09-18

## 2023-09-18 VITALS
HEART RATE: 53 BPM | HEIGHT: 64 IN | SYSTOLIC BLOOD PRESSURE: 121 MMHG | BODY MASS INDEX: 31.07 KG/M2 | DIASTOLIC BLOOD PRESSURE: 71 MMHG | WEIGHT: 182 LBS

## 2023-09-18 DIAGNOSIS — K21.9 GASTROESOPHAGEAL REFLUX DISEASE, UNSPECIFIED WHETHER ESOPHAGITIS PRESENT: Primary | ICD-10-CM

## 2023-09-18 DIAGNOSIS — R14.0 BLOATING: ICD-10-CM

## 2023-09-18 DIAGNOSIS — K76.9 LIVER LESION: Primary | ICD-10-CM

## 2023-09-18 DIAGNOSIS — K59.00 CONSTIPATION, UNSPECIFIED CONSTIPATION TYPE: ICD-10-CM

## 2023-09-18 DIAGNOSIS — Z12.11 COLON CANCER SCREENING: ICD-10-CM

## 2023-09-18 DIAGNOSIS — K21.9 GASTROESOPHAGEAL REFLUX DISEASE, UNSPECIFIED WHETHER ESOPHAGITIS PRESENT: ICD-10-CM

## 2023-09-18 DIAGNOSIS — K76.9 LIVER LESION: ICD-10-CM

## 2023-09-18 DIAGNOSIS — R10.13 EPIGASTRIC PAIN: ICD-10-CM

## 2023-09-18 LAB
HBV CORE AB SERPL QL IA: NONREACTIVE
HBV SURFACE AB SER QL: NONREACTIVE
HBV SURFACE AB SERPL IA-ACNC: <3.1 MIU/ML
HBV SURFACE AG SER-ACNC: <0.1 [IU]/L
HBV SURFACE AG SERPL QL IA: NONREACTIVE

## 2023-09-18 PROCEDURE — 36415 COLL VENOUS BLD VENIPUNCTURE: CPT

## 2023-09-18 PROCEDURE — 87340 HEPATITIS B SURFACE AG IA: CPT

## 2023-09-18 PROCEDURE — 86706 HEP B SURFACE ANTIBODY: CPT

## 2023-09-18 PROCEDURE — 86704 HEP B CORE ANTIBODY TOTAL: CPT

## 2023-09-18 PROCEDURE — 1126F AMNT PAIN NOTED NONE PRSNT: CPT | Performed by: NURSE PRACTITIONER

## 2023-09-18 PROCEDURE — 99215 OFFICE O/P EST HI 40 MIN: CPT | Performed by: NURSE PRACTITIONER

## 2023-09-18 RX ORDER — FAMOTIDINE 10 MG
10 TABLET ORAL 2 TIMES DAILY
COMMUNITY

## 2023-10-03 ENCOUNTER — ANESTHESIA EVENT (OUTPATIENT)
Dept: ENDOSCOPY | Age: 68
End: 2023-10-03
Payer: MEDICARE

## 2023-10-03 ENCOUNTER — ANESTHESIA (OUTPATIENT)
Dept: ENDOSCOPY | Age: 68
End: 2023-10-03
Payer: MEDICARE

## 2023-10-03 ENCOUNTER — HOSPITAL ENCOUNTER (OUTPATIENT)
Age: 68
Setting detail: HOSPITAL OUTPATIENT SURGERY
Discharge: HOME | End: 2023-10-03
Attending: INTERNAL MEDICINE | Admitting: INTERNAL MEDICINE
Payer: MEDICARE

## 2023-10-03 VITALS
OXYGEN SATURATION: 96 % | HEART RATE: 53 BPM | SYSTOLIC BLOOD PRESSURE: 140 MMHG | DIASTOLIC BLOOD PRESSURE: 71 MMHG | RESPIRATION RATE: 11 BRPM | WEIGHT: 180 LBS | BODY MASS INDEX: 30.73 KG/M2 | HEIGHT: 64 IN

## 2023-10-03 DIAGNOSIS — R14.0 BLOATING: ICD-10-CM

## 2023-10-03 DIAGNOSIS — K21.9 GASTROESOPHAGEAL REFLUX DISEASE, UNSPECIFIED WHETHER ESOPHAGITIS PRESENT: ICD-10-CM

## 2023-10-03 DIAGNOSIS — R10.13 EPIGASTRIC PAIN: ICD-10-CM

## 2023-10-03 PROCEDURE — 88312 SPECIAL STAINS GROUP 1: CPT | Performed by: INTERNAL MEDICINE

## 2023-10-03 PROCEDURE — 99070 SPECIAL SUPPLIES PHYS/QHP: CPT | Performed by: INTERNAL MEDICINE

## 2023-10-03 PROCEDURE — 88305 TISSUE EXAM BY PATHOLOGIST: CPT | Performed by: INTERNAL MEDICINE

## 2023-10-03 PROCEDURE — 43239 EGD BIOPSY SINGLE/MULTIPLE: CPT | Performed by: INTERNAL MEDICINE

## 2023-10-03 RX ORDER — NALOXONE HYDROCHLORIDE 0.4 MG/ML
80 INJECTION, SOLUTION INTRAMUSCULAR; INTRAVENOUS; SUBCUTANEOUS AS NEEDED
OUTPATIENT
Start: 2023-10-03 | End: 2023-10-03

## 2023-10-03 RX ORDER — SODIUM CHLORIDE, SODIUM LACTATE, POTASSIUM CHLORIDE, CALCIUM CHLORIDE 600; 310; 30; 20 MG/100ML; MG/100ML; MG/100ML; MG/100ML
INJECTION, SOLUTION INTRAVENOUS CONTINUOUS
OUTPATIENT
Start: 2023-10-03

## 2023-10-03 RX ORDER — SODIUM CHLORIDE, SODIUM LACTATE, POTASSIUM CHLORIDE, CALCIUM CHLORIDE 600; 310; 30; 20 MG/100ML; MG/100ML; MG/100ML; MG/100ML
INJECTION, SOLUTION INTRAVENOUS CONTINUOUS
Status: DISCONTINUED | OUTPATIENT
Start: 2023-10-03 | End: 2023-10-03

## 2023-10-03 RX ORDER — LIDOCAINE HYDROCHLORIDE 10 MG/ML
INJECTION, SOLUTION EPIDURAL; INFILTRATION; INTRACAUDAL; PERINEURAL AS NEEDED
Status: DISCONTINUED | OUTPATIENT
Start: 2023-10-03 | End: 2023-10-03 | Stop reason: SURG

## 2023-10-03 RX ADMIN — SODIUM CHLORIDE, SODIUM LACTATE, POTASSIUM CHLORIDE, CALCIUM CHLORIDE: 600; 310; 30; 20 INJECTION, SOLUTION INTRAVENOUS at 12:55:00

## 2023-10-03 RX ADMIN — LIDOCAINE HYDROCHLORIDE 50 MG: 10 INJECTION, SOLUTION EPIDURAL; INFILTRATION; INTRACAUDAL; PERINEURAL at 12:49:00

## 2023-10-03 RX ADMIN — SODIUM CHLORIDE, SODIUM LACTATE, POTASSIUM CHLORIDE, CALCIUM CHLORIDE: 600; 310; 30; 20 INJECTION, SOLUTION INTRAVENOUS at 12:48:00

## 2023-10-03 NOTE — DISCHARGE INSTRUCTIONS
Home Care Instructions for Gastroscopy with Sedation    Diet:  - Resume your regular diet as tolerated unless otherwise instructed. - Start with light meals to minimize bloating.  - Do not drink alcohol today. Medication:  - If you have questions about resuming your normal medications, please contact your Primary Care Physician. Activities:  - Take it easy today. Do not return to work today. - Do not drive today. - Do not operate any machinery today (including kitchen equipment). Gastroscopy:  - You may have a sore throat for 2-3 days following the exam. This is normal. Gargling with warm salt water (1/2 tsp salt to 1 glass warm water) or using throat lozenges will help. - If you experience any sharp pain in your neck, abdomen or chest, vomiting of blood, oral temperature over 100 degrees Fahrenheit, light-headedness or dizziness, or any other problems, contact your doctor. **If unable to reach your doctor, please go to the [facility] AND [facility] Emergency Room**    - Your referring physician will receive a full report of your examination.  - If you do not hear from your doctor's office within two weeks of your biopsy, please call them for your results. You may be able to see your laboratory results in [place] between 4 and 7 business days. In some cases, your physician may not have viewed the results before they are released to [address] If you have questions regarding your results contact the physician who ordered the test/exam by phone or via [address] by choosing \"Ask a Medical Question. \"

## 2023-10-03 NOTE — OPERATIVE REPORT
[facility] Endoscopy Report  Date of procedure-October 3, 2023    Preoperative Diagnosis:  -GERD  -Epigastric abdominal pain and bloating      Postoperative Diagnosis:  -Hiatal hernia 3 cm  -Gastritis    Procedure:    Esophagogastroduodenoscopy       Surgeon:  Karishma Menendez M.D. Anesthesia:  MAC    Technique:  After informed consent, the patient was placed in the left lateral recumbent position. An Olympus adult HD gastroscope was inserted into the hypopharynx and advanced under direct vision into the esophagus, stomach and duodenum. The endoscope was withdrawn to the stomach where retroflexion of the annulus, body, cardia and fundus was performed. The instrument was straightened, insufflated air and fluid were suctioned and the endoscope was withdrawn. The procedure was well tolerated without immediate complication. Findings: The esophagus showed subtle irregularity at the GE junction/Z-line consistent with reflux esophagitis, biopsies taken to rule out Ramírez's. The GE junction and diaphragmatic impression were at 36 cm and 39 cm for 3 cm sliding-type hiatal hernia. The stomach distended appropriately with insufflated air. The mucosa of the stomach showed patchy subtle erythema throughout the entire stomach, biopsies taken. The duodenal bulb and post bulbar regions were normal.    Estimated blood loss-insignificant  Specimens-see above    Impression:  -Hiatal hernia 3 cm  -Gastritis    Recommendations:  - Post procedure instructions given  - Continue on H2 blocker  - Follow up on upper GI biopsies        Marie Christopher.  Nancy Rutledge MD  10/3/2023  12:57 PM

## 2023-10-03 NOTE — ANESTHESIA POSTPROCEDURE EVALUATION
Patient: Nacho Crandall    Procedure Summary       Date: 10/03/23 Room / Location: [facility] ENDOSCOPY 01 / [facility] (formerly [facility]) ENDO    Anesthesia Start: 0824 Anesthesia Stop: 1300    Procedure: ESOPHAGOGASTRODUODENOSCOPY (EGD) Diagnosis:       Gastroesophageal reflux disease, unspecified whether esophagitis present      Bloating      Epigastric pain      (gastritis, small hiatal hernia)    Surgeons: Juan Roberts MD Anesthesiologist: Heena Bolden DO    Anesthesia Type: MAC ASA Status: 2            Anesthesia Type: MAC    Vitals Value Taken Time   /49 10/03/23 1300   Temp  10/03/23 1302   Pulse 54 10/03/23 1300   Resp 18 10/03/23 1300   SpO2 97 % 10/03/23 1300       [facility] AN Post Evaluation:   Patient Evaluated in PACU  Patient Participation: complete - patient participated  Level of Consciousness: awake  Pain Management: adequate  Airway Patency:patent  Dental exam unchanged from preop  Yes    Cardiovascular Status: acceptable  Respiratory Status: acceptable  Postoperative Hydration acceptable      HESHAM ELLIOTT DO  10/3/2023 1:02 PM

## 2023-10-03 NOTE — H&P
History & Physical Examination    Patient Name: Camillia Severs  MRN: [id number]  [facility]: [id number]  YOB: 1955    Diagnosis:   GERD  Epigastric pain       famotidine 10 MG Oral Tab, Take 1 tablet (10 mg total) by mouth 2 (two) times daily. , Disp: , Rfl:   ergocalciferol 1.25 MG (61236 UT) Oral Cap, Take 1 capsule (50,000 Units total) by mouth once a week., Disp: 12 capsule, Rfl: 1  lisinopril (PRINIVIL,ZESTRIL) 5 MG Oral Tab, Take 1 tablet (5 mg total) by mouth daily. , Disp: , Rfl:   cyclobenzaprine 5 MG Oral Tab, Take 1 tablet (5 mg total) by mouth nightly as needed for Muscle spasms. (Patient not taking: Reported on 9/18/2023), Disp: 10 tablet, Rfl: 0  albuterol 108 (90 Base) MCG/ACT Inhalation Aero Soln, Inhale 2 puffs into the lungs every 6 (six) hours as needed for Wheezing or Shortness of Breath. (Patient not taking: Reported on 9/18/2023), Disp: 1 each, Rfl: 1  meclizine 25 MG Oral Tab, Take 1 tablet (25 mg total) by mouth 3 (three) times daily as needed. (Patient not taking: Reported on 9/18/2023), Disp: 30 tablet, Rfl: 1  Fluocinonide 0.05 % External Cream, Use bid for eczema on legs (Patient not taking: Reported on 9/26/2023), Disp: 60 g, Rfl: 3, Not Taking  cholecalciferol 25 MCG (1000 UT) Oral Cap, Take 1 capsule (1,000 Units total) by mouth daily. , Disp: , Rfl:       lactated ringers infusion, , Intravenous, Continuous        Allergies:   Amoxicillin             RASH    Past Medical History:   Diagnosis Date    Basal cell carcinoma     wide excision, 2010    Essential tremor     drug therapy    Heart valve disease     leaky valve    High blood pressure     Lipid screening 11/23/2013    per NG    Osteoporosis screening 03/30/2011    per NG    Pelvic relaxation     TV, 1999    Skin cancer     BCC x 2 at forehead    Unspecified essential hypertension     drug therapy    Visual impairment      Past Surgical History:   Procedure Laterality Date    COLONOSCOPY  7/22/2011    per     COLONOSCOPY N/A 4/7/2023    Procedure: COLONOSCOPY;  Surgeon: Alvarez Armendariz MD;  [facility]    TVH-enlarged uterus. Ovaries remain     Family History   Problem Relation Age of Onset    Cancer Father         liver (cause of death)    Diabetes Father     Glaucoma Mother 80    Other (Other) Mother     Cancer Other         family h/o liver    Diabetes Other         family h/o     Cancer Self 61        basal cell    Breast Cancer Neg      Social History    Tobacco Use      Smoking status: Never      Smokeless tobacco: Never    Alcohol use: Not Currently      Alcohol/week: 0.0 standard drinks of alcohol      Comment: wine, 1 glass occasionally      SYSTEM Check if Review is Normal Check if Physical Exam is Normal If not normal, please explain:   HEENT [x ] [ x]    NECK & BACK [x ] [x ]    HEART [x ] [ x]    LUNGS [x ] [ x]    ABDOMEN [x ] [x ]    UROGENITAL [ ] [ ]    EXTREMITIES [x ] [x ]    OTHER        [ x ] I have discussed the risks and benefits and alternatives with the patient/family. They understand and agree to proceed with plan of care. [ x ] I have reviewed the History and Physical done within the last 30 days. Any changes noted above. Barbie Cordova.  Tab Tripp MD  10/3/2023  12:35 PM

## 2023-10-03 NOTE — ANESTHESIA PREPROCEDURE EVALUATION
Anesthesia PreOp Note    HPI:     Alfreda Castellanos is a 75year old female who presents for preoperative consultation requested by: Derek Hutchison MD    Date of Surgery: 10/3/2023    Procedure(s):  ESOPHAGOGASTRODUODENOSCOPY (EGD)  Indication: Gastroesophageal reflux disease, unspecified whether esophagitis present/ Bloating/ Epigastric pain    Relevant Problems   No relevant active problems       NPO:  Last Liquid Consumption Date: 10/03/23  Last Liquid Consumption Time: 0930  Last Solid Consumption Date: 10/02/23  Last Solid Consumption Time: 2100  Last Liquid Consumption Date: 10/03/23          History Review:  Patient Active Problem List    Abdominal pain         Date Noted: 08/26/2023      Encounter for screening mammogram for malignant neoplasm of breast         Date Noted: 08/26/2023      Abnormality of heart valve         Date Noted: 04/24/2022      Asymptomatic menopause         Date Noted: 12/19/2020      Osteopenia         Date Noted: 12/19/2020      Prediabetes         Date Noted: 12/19/2020      Valvular heart disease         Date Noted: 12/19/2020      H/O: hysterectomy         Date Noted: 12/19/2020      Hyperlipidemia         Date Noted: 12/19/2020      History of basal cell carcinoma of skin         Date Noted: 12/19/2020      Aortic valve insufficiency         Date Noted: 11/28/2016      Routine general medical examination at a health care facility         Date Noted: 07/11/2015        Past Medical History:   Diagnosis Date    Basal cell carcinoma     wide excision, 2010    Essential tremor     drug therapy    Heart valve disease     leaky valve    High blood pressure     Lipid screening 11/23/2013    per NG    Osteoporosis screening 03/30/2011    per NG    Pelvic relaxation     TVH, 1999    Skin cancer     BCC x 2 at forehead    Unspecified essential hypertension     drug therapy    Visual impairment        Past Surgical History:   Procedure Laterality Date    COLONOSCOPY  7/22/2011    per NG COLONOSCOPY N/A 4/7/2023    Procedure: COLONOSCOPY;  Surgeon: Juan Roberts MD;  Location: [place] [address]    TVH-enlarged uterus. Ovaries remain       famotidine 10 MG Oral Tab, Take 1 tablet (10 mg total) by mouth 2 (two) times daily. , Disp: , Rfl:   ergocalciferol 1.25 MG (61855 UT) Oral Cap, Take 1 capsule (50,000 Units total) by mouth once a week., Disp: 12 capsule, Rfl: 1  lisinopril (PRINIVIL,ZESTRIL) 5 MG Oral Tab, Take 1 tablet (5 mg total) by mouth daily. , Disp: , Rfl: , 10/2/2023  cyclobenzaprine 5 MG Oral Tab, Take 1 tablet (5 mg total) by mouth nightly as needed for Muscle spasms. (Patient not taking: Reported on 9/18/2023), Disp: 10 tablet, Rfl: 0  albuterol 108 (90 Base) MCG/ACT Inhalation Aero Soln, Inhale 2 puffs into the lungs every 6 (six) hours as needed for Wheezing or Shortness of Breath. (Patient not taking: Reported on 9/18/2023), Disp: 1 each, Rfl: 1  meclizine 25 MG Oral Tab, Take 1 tablet (25 mg total) by mouth 3 (three) times daily as needed. (Patient not taking: Reported on 9/18/2023), Disp: 30 tablet, Rfl: 1  Fluocinonide 0.05 % External Cream, Use bid for eczema on legs (Patient not taking: Reported on 9/26/2023), Disp: 60 g, Rfl: 3, Not Taking  cholecalciferol 25 MCG (1000 UT) Oral Cap, Take 1 capsule (1,000 Units total) by mouth daily. , Disp: , Rfl:       lactated ringers infusion, , Intravenous, Continuous, Dino Goodman MD    No current [facility]-ordered outpatient medications on file.         Amoxicillin             RASH    Family History   Problem Relation Age of Onset    Cancer Father         liver (cause of death)    Diabetes Father     Glaucoma Mother 80    Other (Other) Mother     Cancer Other         family h/o liver    Diabetes Other         family h/o     Cancer Self 61        basal cell    Breast Cancer Neg      Social History    Socioeconomic History      Marital status:     Tobacco Use      Smoking status: Never      Smokeless tobacco: Never Vaping Use      Vaping Use: Never used    Substance and Sexual Activity      Alcohol use: Not Currently        Alcohol/week: 0.0 standard drinks of alcohol        Comment: wine, 1 glass occasionally      Drug use: Not Currently      Sexual activity: Not Currently    Other Topics      Concerns:        Caffeine Concern: Yes          coffee, 2 cups/day        Pt has a pacemaker: No        Pt has a defibrillator: No        Reaction to local anesthetic: No      Available pre-op labs reviewed. Lab Results   Component Value Date    WBC 4.8 08/27/2023    RBC 4.71 08/27/2023    HGB 14.6 08/27/2023    HCT 44.1 08/27/2023    MCV 93.6 08/27/2023    MCH 31.0 08/27/2023    MCHC 33.1 08/27/2023    RDW 12.6 08/27/2023    .0 08/27/2023     Lab Results   Component Value Date     08/27/2023    K 4.2 08/27/2023     08/27/2023    CO2 15.0 (L) 08/27/2023    BUN 16 08/27/2023    CREATSERUM 1.06 (H) 08/27/2023     (H) 08/27/2023    CA 8.8 08/27/2023          Vital Signs: There is no height or weight on file to calculate BMI.   vitals were not taken for this visit. There were no vitals filed for this visit. Anesthesia Evaluation      Airway   Mallampati: II  TM distance: >3 FB  Neck ROM: full  Dental          Pulmonary - negative ROS and normal exam   Cardiovascular - normal exam  (+) valvular problems/murmurs AI    Neuro/Psych      GI/Hepatic/Renal - negative ROS     Endo/Other - negative ROS   Abdominal                  Anesthesia Plan:   ASA:  2  Plan:   MAC  Plan Comments: I have discussed the anesthetic plan, major risks and alternatives with the patient and answered all questions. The patient desires to proceed with surgery and anesthesia as planned.      Informed Consent Plan and Risks Discussed With:  Patient      I have informed Verna Robert and/or legal guardian or family member of the nature of the anesthetic plan, benefits, risks including possible dental damage if relevant, major complications, and any alternative forms of anesthetic management. All of the patient's questions were answered to the best of my ability. The patient desires the anesthetic management as planned.   Shivam Moss DO  10/3/2023 12:43 PM  Present on Admission:  **None**

## 2023-10-12 ENCOUNTER — HOSPITAL ENCOUNTER (OUTPATIENT)
Dept: MRI IMAGING | Age: 68
Discharge: HOME | End: 2023-10-12
Attending: NURSE PRACTITIONER
Payer: MEDICARE

## 2023-10-12 DIAGNOSIS — K76.9 LIVER LESION: ICD-10-CM

## 2023-10-12 PROCEDURE — A9575 INJ GADOTERATE MEGLUMI 0.1ML: HCPCS | Performed by: NURSE PRACTITIONER

## 2023-10-12 PROCEDURE — 74183 MRI ABD W/O CNTR FLWD CNTR: CPT | Performed by: NURSE PRACTITIONER

## 2023-10-12 RX ORDER — GADOTERATE MEGLUMINE 376.9 MG/ML
20 INJECTION INTRAVENOUS
Status: COMPLETED | OUTPATIENT
Start: 2023-10-12 | End: 2023-10-12

## 2023-10-12 RX ADMIN — GADOTERATE MEGLUMINE 17 ML: 376.9 INJECTION INTRAVENOUS at 15:05:00

## 2023-10-17 ENCOUNTER — TELEPHONE (OUTPATIENT)
Dept: GASTROENTEROLOGY | Facility: CLINIC | Age: 68
End: 2023-10-17

## 2023-10-17 DIAGNOSIS — K76.9 HEPATIC LESION: Primary | ICD-10-CM

## 2023-10-17 NOTE — TELEPHONE ENCOUNTER
Pt contacted by APN  Regarding MRI 10/13/23:    Impression   CONCLUSION:  1. There is an area of arterial enhancement in segment VI with an equivocal potential underlying lesion. This may represent focal nodular hyperplasia, with adenoma considered less likely. A perfusional variant is also a differential diagnostic  possibility. Follow-up MRI of the abdomen with and without contrast is recommended in 6 months utilizing Eovist hepatobiliary excreted contrast for further assessment. 2. Hepatic steatosis. 3. Lesser incidental findings as above. Dictated by (CST): Marisela Toro MD on 10/13/2023 at 8:51 AM         LFTs normal 8/27/23. Recommend:  Mri in 6 mos (4/2024)  Healthy bmi  Low-fat diet  Monitor cholesterol, blood sugar, liver function testing with primary care and consider need for further work-up if indicated. She verbalizes understanding and is in agreement with the plan.

## 2023-10-19 ENCOUNTER — HOSPITAL ENCOUNTER (OUTPATIENT)
Dept: MAMMOGRAPHY | Age: 68
Discharge: HOME | End: 2023-10-19
Attending: INTERNAL MEDICINE
Payer: MEDICARE

## 2023-10-19 ENCOUNTER — HOSPITAL ENCOUNTER (OUTPATIENT)
Dept: BONE DENSITY | Age: 68
Discharge: HOME | End: 2023-10-19
Attending: INTERNAL MEDICINE
Payer: MEDICARE

## 2023-10-19 DIAGNOSIS — Z00.00 ROUTINE GENERAL MEDICAL EXAMINATION AT A HEALTH CARE FACILITY: ICD-10-CM

## 2023-10-19 DIAGNOSIS — Z00.00 MEDICARE ANNUAL WELLNESS VISIT, SUBSEQUENT: ICD-10-CM

## 2023-10-19 DIAGNOSIS — Z90.710 H/O: HYSTERECTOMY: ICD-10-CM

## 2023-10-19 DIAGNOSIS — Z12.31 ENCOUNTER FOR SCREENING MAMMOGRAM FOR MALIGNANT NEOPLASM OF BREAST: ICD-10-CM

## 2023-10-19 DIAGNOSIS — Q24.8 ABNORMALITY OF HEART VALVE: ICD-10-CM

## 2023-10-19 DIAGNOSIS — Z78.0 ASYMPTOMATIC MENOPAUSE: ICD-10-CM

## 2023-10-19 DIAGNOSIS — R10.9 ABDOMINAL PAIN, UNSPECIFIED ABDOMINAL LOCATION: ICD-10-CM

## 2023-10-19 DIAGNOSIS — Z85.828 HISTORY OF BASAL CELL CARCINOMA OF SKIN: ICD-10-CM

## 2023-10-19 DIAGNOSIS — R73.03 PREDIABETES: ICD-10-CM

## 2023-10-19 DIAGNOSIS — I38 VALVULAR HEART DISEASE: ICD-10-CM

## 2023-10-19 DIAGNOSIS — I35.1 AORTIC VALVE INSUFFICIENCY, ETIOLOGY OF CARDIAC VALVE DISEASE UNSPECIFIED: ICD-10-CM

## 2023-10-19 DIAGNOSIS — M85.80 OSTEOPENIA, UNSPECIFIED LOCATION: ICD-10-CM

## 2023-10-19 DIAGNOSIS — E78.5 HYPERLIPIDEMIA, UNSPECIFIED HYPERLIPIDEMIA TYPE: ICD-10-CM

## 2023-10-19 PROCEDURE — 77067 SCR MAMMO BI INCL CAD: CPT | Performed by: INTERNAL MEDICINE

## 2023-10-19 PROCEDURE — 77063 BREAST TOMOSYNTHESIS BI: CPT | Performed by: INTERNAL MEDICINE

## 2023-10-19 PROCEDURE — 77080 DXA BONE DENSITY AXIAL: CPT | Performed by: INTERNAL MEDICINE

## 2023-10-21 ENCOUNTER — LAB ENCOUNTER (OUTPATIENT)
Dept: LAB | Facility: HOSPITAL | Age: 68
End: 2023-10-21
Attending: INTERNAL MEDICINE

## 2023-10-21 DIAGNOSIS — N17.9 AKI (ACUTE KIDNEY INJURY) (HCC): ICD-10-CM

## 2023-10-21 DIAGNOSIS — R82.90 ABNORMAL URINALYSIS: ICD-10-CM

## 2023-10-21 LAB
ANION GAP SERPL CALC-SCNC: 4 MMOL/L (ref 0–18)
BILIRUB UR QL: NEGATIVE
BUN BLD-MCNC: 15 MG/DL (ref 7–18)
BUN/CREAT SERPL: 14.3 (ref 10–20)
CALCIUM BLD-MCNC: 8.9 MG/DL (ref 8.5–10.1)
CHLORIDE SERPL-SCNC: 108 MMOL/L (ref 98–112)
CLARITY UR: CLEAR
CO2 SERPL-SCNC: 29 MMOL/L (ref 21–32)
COLOR UR: YELLOW
CREAT BLD-MCNC: 1.05 MG/DL
CREAT UR-SCNC: 215 MG/DL
EGFRCR SERPLBLD CKD-EPI 2021: 58 ML/MIN/1.73M2 (ref 60–?)
FASTING STATUS PATIENT QL REPORTED: YES
GLUCOSE BLD-MCNC: 108 MG/DL (ref 70–99)
GLUCOSE UR-MCNC: NORMAL MG/DL
HGB UR QL STRIP.AUTO: NEGATIVE
KETONES UR-MCNC: NEGATIVE MG/DL
LEUKOCYTE ESTERASE UR QL STRIP.AUTO: 75
MICROALBUMIN UR-MCNC: 1.1 MG/DL
MICROALBUMIN/CREAT 24H UR-RTO: 5.1 UG/MG (ref ?–30)
NITRITE UR QL STRIP.AUTO: NEGATIVE
OSMOLALITY SERPL CALC.SUM OF ELEC: 293 MOSM/KG (ref 275–295)
PH UR: 5.5 [PH] (ref 5–8)
POTASSIUM SERPL-SCNC: 4 MMOL/L (ref 3.5–5.1)
PROT UR-MCNC: NEGATIVE MG/DL
SODIUM SERPL-SCNC: 141 MMOL/L (ref 136–145)
SP GR UR STRIP: 1.02 (ref 1–1.03)
UROBILINOGEN UR STRIP-ACNC: NORMAL

## 2023-10-21 PROCEDURE — 81001 URINALYSIS AUTO W/SCOPE: CPT

## 2023-10-21 PROCEDURE — 82043 UR ALBUMIN QUANTITATIVE: CPT

## 2023-10-21 PROCEDURE — 36415 COLL VENOUS BLD VENIPUNCTURE: CPT

## 2023-10-21 PROCEDURE — 87086 URINE CULTURE/COLONY COUNT: CPT

## 2023-10-21 PROCEDURE — 82570 ASSAY OF URINE CREATININE: CPT

## 2023-10-21 PROCEDURE — 80048 BASIC METABOLIC PNL TOTAL CA: CPT

## 2023-10-23 DIAGNOSIS — N17.9 AKI (ACUTE KIDNEY INJURY) (HCC): Primary | ICD-10-CM

## 2023-10-28 ENCOUNTER — OFFICE VISIT (OUTPATIENT)
Dept: INTERNAL MEDICINE CLINIC | Facility: CLINIC | Age: 68
End: 2023-10-28

## 2023-10-28 VITALS
WEIGHT: 177 LBS | HEART RATE: 96 BPM | SYSTOLIC BLOOD PRESSURE: 120 MMHG | HEIGHT: 64 IN | DIASTOLIC BLOOD PRESSURE: 70 MMHG | BODY MASS INDEX: 30.22 KG/M2 | OXYGEN SATURATION: 97 %

## 2023-10-28 DIAGNOSIS — N18.9 CHRONIC KIDNEY DISEASE, UNSPECIFIED CKD STAGE: ICD-10-CM

## 2023-10-28 DIAGNOSIS — K59.00 CONSTIPATION, UNSPECIFIED CONSTIPATION TYPE: ICD-10-CM

## 2023-10-28 DIAGNOSIS — K76.9 LIVER LESION: ICD-10-CM

## 2023-10-28 DIAGNOSIS — K21.9 GASTROESOPHAGEAL REFLUX DISEASE, UNSPECIFIED WHETHER ESOPHAGITIS PRESENT: ICD-10-CM

## 2023-10-28 DIAGNOSIS — R10.9 ABDOMINAL PAIN, UNSPECIFIED ABDOMINAL LOCATION: Primary | ICD-10-CM

## 2023-10-28 PROCEDURE — 99214 OFFICE O/P EST MOD 30 MIN: CPT | Performed by: INTERNAL MEDICINE

## 2023-10-28 RX ORDER — MECLIZINE HYDROCHLORIDE 25 MG/1
25 TABLET ORAL 3 TIMES DAILY PRN
Qty: 30 TABLET | Refills: 2 | Status: SHIPPED | OUTPATIENT
Start: 2023-10-28

## 2023-11-09 ENCOUNTER — OFFICE VISIT (OUTPATIENT)
Dept: DERMATOLOGY CLINIC | Facility: CLINIC | Age: 68
End: 2023-11-09

## 2023-11-09 DIAGNOSIS — D22.9 MULTIPLE NEVI: ICD-10-CM

## 2023-11-09 DIAGNOSIS — D23.4 BENIGN NEOPLASM OF SCALP AND SKIN OF NECK: ICD-10-CM

## 2023-11-09 DIAGNOSIS — D23.5 BENIGN NEOPLASM OF SKIN OF TRUNK: ICD-10-CM

## 2023-11-09 DIAGNOSIS — Z85.828 HISTORY OF NONMELANOMA SKIN CANCER: ICD-10-CM

## 2023-11-09 DIAGNOSIS — D23.30 BENIGN NEOPLASM OF SKIN OF FACE: ICD-10-CM

## 2023-11-09 DIAGNOSIS — D23.60 BENIGN NEOPLASM OF SKIN OF UPPER LIMB, INCLUDING SHOULDER, UNSPECIFIED LATERALITY: ICD-10-CM

## 2023-11-09 DIAGNOSIS — D48.5 NEOPLASM OF UNCERTAIN BEHAVIOR OF SKIN: Primary | ICD-10-CM

## 2023-11-09 DIAGNOSIS — L82.1 SEBORRHEIC KERATOSES: ICD-10-CM

## 2023-11-09 DIAGNOSIS — D23.70 BENIGN NEOPLASM OF SKIN OF LOWER LIMB, INCLUDING HIP, UNSPECIFIED LATERALITY: ICD-10-CM

## 2023-11-09 PROCEDURE — 11102 TANGNTL BX SKIN SINGLE LES: CPT | Performed by: DERMATOLOGY

## 2023-11-09 PROCEDURE — 88305 TISSUE EXAM BY PATHOLOGIST: CPT | Performed by: DERMATOLOGY

## 2023-11-09 PROCEDURE — 1126F AMNT PAIN NOTED NONE PRSNT: CPT | Performed by: DERMATOLOGY

## 2023-11-09 PROCEDURE — 99213 OFFICE O/P EST LOW 20 MIN: CPT | Performed by: DERMATOLOGY

## 2023-11-14 NOTE — PROGRESS NOTES
The pathology report from last visit showed   right medial cheek, shave biopsy:  -Intradermal nevus and adjacent seborrheic keratosis   . Please log in test results, send biopsy results letter. Pt to rtc 1 year or prn.

## 2023-11-20 NOTE — PROGRESS NOTES
Patricia Dubois is a 70year old female. HPI:     CC:    Chief Complaint   Patient presents with    Full Skin Exam     Hx of [address], LOV 12/8/22; presents today for upper body skin exam, states no particular areas of concern          Allergies:  Amoxicillin    HISTORY:    Past Medical History:   Diagnosis Date    Basal cell carcinoma     wide excision, 2010    Essential tremor     drug therapy    Heart valve disease     leaky valve    High blood pressure     Lipid screening 11/23/2013    per NG    Liver lesion 10/28/2023    Osteoporosis screening 03/30/2011    per NG    Pelvic relaxation     TVH, 1999    Skin cancer     BCC x 2 at forehead    Unspecified essential hypertension     drug therapy    Visual impairment       Past Surgical History:   Procedure Laterality Date    COLONOSCOPY  7/22/2011    per NG    COLONOSCOPY N/A 4/7/2023    Procedure: COLONOSCOPY;  Surgeon: Yocasta Lance MD;  Location: [place] [address]    TV-enlarged uterus.  Ovaries remain      Family History   Problem Relation Age of Onset    Cancer Father         liver (cause of death)    Diabetes Father     Glaucoma Mother 80    Other (Other) Mother     Cancer Other         family h/o liver    Diabetes Other         family h/o     Cancer Self 61        basal cell    Breast Cancer Neg       Social History     Socioeconomic History    Marital status:    Tobacco Use    Smoking status: Never    Smokeless tobacco: Never   Vaping Use    Vaping Use: Never used   Substance and Sexual Activity    Alcohol use: Not Currently     Alcohol/week: 0.0 standard drinks of alcohol     Comment: wine, 1 glass occasionally    Drug use: Not Currently    Sexual activity: Not Currently   Other Topics Concern    Caffeine Concern Yes     Comment: coffee, 2 cups/day    Pt has a pacemaker No    Pt has a defibrillator No    Reaction to local anesthetic No        Current Outpatient Medications   Medication Sig Dispense Refill    meclizine 25 MG Oral Tab Take 1 tablet (25 mg total) by mouth 3 (three) times daily as needed. 30 tablet 2    famotidine 10 MG Oral Tab Take 1 tablet (10 mg total) by mouth 2 (two) times daily. ergocalciferol 1.25 MG (20876 UT) Oral Cap Take 1 capsule (50,000 Units total) by mouth once a week. 12 capsule 1    albuterol 108 (90 Base) MCG/ACT Inhalation Aero Soln Inhale 2 puffs into the lungs every 6 (six) hours as needed for Wheezing or Shortness of Breath. 1 each 1    lisinopril (PRINIVIL,ZESTRIL) 5 MG Oral Tab Take 1 tablet (5 mg total) by mouth daily. cyclobenzaprine 5 MG Oral Tab Take 1 tablet (5 mg total) by mouth nightly as needed for Muscle spasms. (Patient not taking: Reported on 9/18/2023) 10 tablet 0    Fluocinonide 0.05 % External Cream Use bid for eczema on legs (Patient not taking: Reported on 9/26/2023) 60 g 3    cholecalciferol 25 MCG (1000 UT) Oral Cap Take 1 capsule (1,000 Units total) by mouth daily. Allergies: Allergies   Allergen Reactions    Amoxicillin RASH       Past Medical History:   Diagnosis Date    Basal cell carcinoma     wide excision, 2010    Essential tremor     drug therapy    Heart valve disease     leaky valve    High blood pressure     Lipid screening 11/23/2013    per NG    Liver lesion 10/28/2023    Osteoporosis screening 03/30/2011    per NG    Pelvic relaxation     TVH, 1999    Skin cancer     BCC x 2 at forehead    Unspecified essential hypertension     drug therapy    Visual impairment      Past Surgical History:   Procedure Laterality Date    COLONOSCOPY  7/22/2011    per NG    COLONOSCOPY N/A 4/7/2023    Procedure: COLONOSCOPY;  Surgeon: Kristi Lujan MD;  Location: [place] [address]    TV-enlarged uterus.  Ovaries remain     Social History     Socioeconomic History    Marital status:      Spouse name: Not on file    Number of children: Not on file    Years of education: Not on file    Highest education level: Not on file   Occupational History    Not on file   Tobacco Use    Smoking status: Never    Smokeless tobacco: Never   Vaping Use    Vaping Use: Never used   Substance and Sexual Activity    Alcohol use: Not Currently     Alcohol/week: 0.0 standard drinks of alcohol     Comment: wine, 1 glass occasionally    Drug use: Not Currently    Sexual activity: Not Currently   Other Topics Concern     Service Not Asked    Blood Transfusions Not Asked    Caffeine Concern Yes     Comment: coffee, 2 cups/day    Occupational Exposure Not Asked    Hobby Hazards Not Asked    Sleep Concern Not Asked    Stress Concern Not Asked    Weight Concern Not Asked    Special Diet Not Asked    Back Care Not Asked    Exercise Not Asked    Bike Helmet Not Asked    Seat Belt Not Asked    Self-Exams Not Asked    Grew up on a farm Not Asked    History of tanning Not Asked    Outdoor occupation Not Asked    Pt has a pacemaker No    Pt has a defibrillator No    Breast feeding Not Asked    Reaction to local anesthetic No   Social History Narrative    Not on file     Social Determinants of Health     Financial Resource Strain: Not on file   Food Insecurity: Not on file   Transportation Needs: Not on file   Physical Activity: Not on file   Stress: Not on file   Social Connections: Not on file   Housing Stability: Not on file     Family History   Problem Relation Age of Onset    Cancer Father         liver (cause of death)    Diabetes Father     Glaucoma Mother 80    Other (Other) Mother     Cancer Other         family h/o liver    Diabetes Other         family h/o     Cancer Self 56        basal cell    Breast Cancer Neg        There were no vitals filed for this visit. HPI:    Chief Complaint   Patient presents with    Full Skin Exam     Hx of [address], LOV 12/8/22; presents today for upper body skin exam, states no particular areas of concern      Past notes/ records and appropriate/relevant lab results including pathology and past body maps reviewed.  Updated and new information noted in current visit. Follow-up history BCC  No family history skin cancer    History of BCC anterior hairline post excision in the past.  Has been doing well. Patient presents with concerns above. Patient has been in their usual state of health. History, medications, allergies reviewed as noted. ROS:  Denies any other systemic complaints. No new or changeing lesions other than noted above. No fevers, chills, night sweats, unusual sun sensitivity. No other skin complaints. History, medications, allergies reviewed as noted. Physical Examination:     Well-developed well-nourished patient alert oriented in no acute distress. Exam total-body performed, including scalp, head, neck, face,nails, hair, external eyes, including conjunctival mucosa, eyelids, lips external ears, back, chest,/ breasts, axillae,  abdomen, arms, legs, palms. Multiple light to medium brown, well marginated, uniformly pigmented, macules and papules 6 mm and less scattered on exam. pigmented lesions examined with dermoscopy benign-appearing patterns. Waxy tannish keratotic papules scattered, cherry-red vascular papules scattered. See map today's date for lesions noted . Otherwise remarkable for lesions as noted on map. See Assessment /Plan for additional history and physical exam also:    Assessment / plan:    Orders Placed This Encounter   Procedures    Specimen to Pathology, Tissue [IHP Pt to [address] [place] [address] for this Visit:  Requested Prescriptions      No prescriptions requested or ordered in this encounter         Encounter Diagnosis   Name Primary?     Neoplasm of uncertain behavior of skin Yes        Pt with lesion new was bleeding 6mm tan pearly papule on erythematous base r/o BCC vs irritated sk,   Shave/ tangential biopsy performed, operative note and consent in chart further plans pending pathology    Inflamed SKs over the temples,  No other new suspicious lesions  Numerous benign keratoses over the neck clavicular area upper chest.  Waxy tan keratotic papules lesions in areas of concern as noted reassurance given. Benign nature discussed. Possibility of cryo, alphahydroxy acids over-the-counter retinol's discussed. No new suspicious lesions exam otherwise unchanged. Reassurance given. Trial cryo tomorrow irritated lesions. Patient with history of skin cancer. No evidence of recurrence. No new skin cancer. Excellent cosmetic outcome at anterior scalp     Biopsy previously benign compound nevus no atypia mid back no recurrence    No other significant changes versus prior exam no active AK's continue monitoring especially along the hairline. Continue careful sun protection precancerous nature discussed. Sun damage along the anterior hairline forehead observe carefully    Multiple waxy tan keratotic papules at bilateral temples anterior scalp mid back, more inflamed clustered lesions at right mid back along bra line   Lesions treated with cryo- . Medically necessary as lesion inflamed and irritated. Biopsy / reevaluation if not resolved. follow-up 6 months    Dermatitis. Stable continue topicals as needed   meds in grid. Skin care instructions reviewed. [place] use of emollients. No other susupicious lesions on todays  exam.    Please refer to map for specific lesions. See additional diagnoses. Pros cons of various therapies, risks benefits discussed. Pathophysiology discussed with patient. Therapeutic options reviewed. See  Medications in grid. Instructions reviewed at length. Benign nevi, seborrheic  keratoses, cherry angiomas:  Reassurance regarding other benign skin lesions. Signs and symptoms of skin cancer, ABCDE's of melanoma discussed with patient. Sunscreen use, sun protection, self exams reviewed. Followup as noted RTC routine checkup 6 mos - one year or p.r.n.   Encounter Times  Including precharting, reviewing chart, prior notes obtaining history: 5 minutes, medical exam :10 minutes, notes on body map, plan, counseling 10minutes My total time spent caring for the patient on the day of the encounter: 25 minutes     The patient indicates understanding of these issues and agrees to the plan. The patient is asked to return as noted in follow-up/ above. This note was generated using Dragon voice recognition software. Please contact me regarding any confusion resulting from errors in recognition.

## 2023-11-20 NOTE — PROGRESS NOTES
Operative Report                     Shave/  Tangential biopsy     Clinical diagnosis:    Size of lesion:    Location:   Pt with lesion new was bleeding 6mm tan pearly papule on erythematous base r/o BCC vs irritated sk,   Procedure: With patient in appropriate position the skin of the above was scrubbed with alcohol. Anesthesia was obtained with 1% Xylocaine with epinephrine. The skin surrounding the lesion was placed under tension and the lesion was incised using a #15 scalpel blade. The specimen was sent for histopathologic exam.    Hemostasis was obtained with electrocautery/aluminum chloride. Estimated blood loss less than 2 cc. Biopsy dressed with Polysporin, bandage. Pressure dressing:   No    Complications: None    Written instructions given and reviewed with patient    Await pathology    Contact information reviewed.     Procedural physician:  Jordyn Josue MD

## 2023-11-27 DIAGNOSIS — Z90.710 H/O: HYSTERECTOMY: ICD-10-CM

## 2023-11-27 DIAGNOSIS — R73.03 PREDIABETES: ICD-10-CM

## 2023-11-27 DIAGNOSIS — Z12.11 SCREENING FOR COLON CANCER: ICD-10-CM

## 2023-11-27 DIAGNOSIS — Z00.00 MEDICARE ANNUAL WELLNESS VISIT, SUBSEQUENT: ICD-10-CM

## 2023-11-27 DIAGNOSIS — M85.80 OSTEOPENIA, UNSPECIFIED LOCATION: ICD-10-CM

## 2023-11-27 DIAGNOSIS — E78.5 HYPERLIPIDEMIA, UNSPECIFIED HYPERLIPIDEMIA TYPE: ICD-10-CM

## 2023-11-27 DIAGNOSIS — Q24.8 ABNORMALITY OF HEART VALVE: ICD-10-CM

## 2023-11-27 DIAGNOSIS — I38 VALVULAR HEART DISEASE: ICD-10-CM

## 2023-11-27 DIAGNOSIS — Z00.00 ROUTINE GENERAL MEDICAL EXAMINATION AT A HEALTH CARE FACILITY: ICD-10-CM

## 2023-11-27 DIAGNOSIS — Z85.828 HISTORY OF BASAL CELL CARCINOMA OF SKIN: ICD-10-CM

## 2023-11-27 DIAGNOSIS — Z78.0 ASYMPTOMATIC MENOPAUSE: ICD-10-CM

## 2023-11-27 DIAGNOSIS — Z12.31 ENCOUNTER FOR SCREENING MAMMOGRAM FOR MALIGNANT NEOPLASM OF BREAST: ICD-10-CM

## 2023-11-27 DIAGNOSIS — I35.1 AORTIC VALVE INSUFFICIENCY, ETIOLOGY OF CARDIAC VALVE DISEASE UNSPECIFIED: ICD-10-CM

## 2023-11-27 RX ORDER — ALBUTEROL SULFATE 90 UG/1
2 AEROSOL, METERED RESPIRATORY (INHALATION) EVERY 6 HOURS PRN
Qty: 1 EACH | Refills: 1 | Status: SHIPPED | OUTPATIENT
Start: 2023-11-27

## 2023-11-27 RX ORDER — ALBUTEROL SULFATE 90 UG/1
2 AEROSOL, METERED RESPIRATORY (INHALATION) EVERY 6 HOURS PRN
Qty: 6.7 EACH | Refills: 1 | Status: SHIPPED | OUTPATIENT
Start: 2023-11-27

## 2023-11-27 NOTE — TELEPHONE ENCOUNTER
Future Appt. 09/09/2024    Last Visit: 10/28/2023    Medication Requested:  Requested Prescriptions     Pending Prescriptions Disp Refills    albuterol 108 (90 Base) MCG/ACT Inhalation Aero Soln 1 each 1     Sig: Inhale 2 puffs into the lungs every 6 (six) hours as needed for Wheezing or Shortness of Breath.         Last refill:        albuterol 108 (90 Base) MCG/ACT Inhalation Aero Soln 1 each 1 4/23/2022

## 2023-11-30 ENCOUNTER — APPOINTMENT (OUTPATIENT)
Dept: GENERAL RADIOLOGY | Age: 68
End: 2023-11-30
Attending: PHYSICIAN ASSISTANT
Payer: MEDICARE

## 2023-11-30 ENCOUNTER — HOSPITAL ENCOUNTER (OUTPATIENT)
Age: 68
Discharge: HOME | End: 2023-11-30
Payer: MEDICARE

## 2023-11-30 VITALS
OXYGEN SATURATION: 96 % | DIASTOLIC BLOOD PRESSURE: 79 MMHG | RESPIRATION RATE: 18 BRPM | SYSTOLIC BLOOD PRESSURE: 152 MMHG | HEART RATE: 87 BPM | TEMPERATURE: 98 F

## 2023-11-30 DIAGNOSIS — B34.9 VIRAL ILLNESS: ICD-10-CM

## 2023-11-30 DIAGNOSIS — R05.1 ACUTE COUGH: Primary | ICD-10-CM

## 2023-11-30 LAB — SARS-COV-2 RNA RESP QL NAA+PROBE: NOT DETECTED

## 2023-11-30 PROCEDURE — 99203 OFFICE O/P NEW LOW 30 MIN: CPT

## 2023-11-30 PROCEDURE — 99214 OFFICE O/P EST MOD 30 MIN: CPT

## 2023-11-30 PROCEDURE — 71046 X-RAY EXAM CHEST 2 VIEWS: CPT | Performed by: PHYSICIAN ASSISTANT

## 2023-11-30 NOTE — ED INITIAL ASSESSMENT (HPI)
Patient arrived ambulatory to room c/o a productive cough that started 4 days ago. +nasal congestion. No fevers. No n/v/d. Easy non labored respirations. No distress.

## 2023-12-20 ENCOUNTER — MED REC SCAN ONLY (OUTPATIENT)
Dept: INTERNAL MEDICINE CLINIC | Facility: CLINIC | Age: 68
End: 2023-12-20

## 2024-04-18 ENCOUNTER — HOSPITAL ENCOUNTER (OUTPATIENT)
Age: 69
Discharge: HOME | End: 2024-04-18
Payer: MEDICARE

## 2024-04-18 ENCOUNTER — APPOINTMENT (OUTPATIENT)
Dept: GENERAL RADIOLOGY | Age: 69
End: 2024-04-18
Attending: NURSE PRACTITIONER
Payer: MEDICARE

## 2024-04-18 VITALS
DIASTOLIC BLOOD PRESSURE: 52 MMHG | TEMPERATURE: 99 F | HEART RATE: 74 BPM | OXYGEN SATURATION: 95 % | SYSTOLIC BLOOD PRESSURE: 159 MMHG | RESPIRATION RATE: 20 BRPM

## 2024-04-18 DIAGNOSIS — R06.2 WHEEZING: ICD-10-CM

## 2024-04-18 DIAGNOSIS — H66.001 ACUTE SUPPURATIVE OTITIS MEDIA OF RIGHT EAR WITHOUT SPONTANEOUS RUPTURE OF TYMPANIC MEMBRANE, RECURRENCE NOT SPECIFIED: Primary | ICD-10-CM

## 2024-04-18 LAB — SARS-COV-2 RNA RESP QL NAA+PROBE: NOT DETECTED

## 2024-04-18 PROCEDURE — 71046 X-RAY EXAM CHEST 2 VIEWS: CPT | Performed by: NURSE PRACTITIONER

## 2024-04-18 PROCEDURE — 94640 AIRWAY INHALATION TREATMENT: CPT

## 2024-04-18 PROCEDURE — 99214 OFFICE O/P EST MOD 30 MIN: CPT

## 2024-04-18 RX ORDER — ALBUTEROL SULFATE 2.5 MG/3ML
2.5 SOLUTION RESPIRATORY (INHALATION) ONCE
Status: COMPLETED | OUTPATIENT
Start: 2024-04-18 | End: 2024-04-18

## 2024-04-18 RX ORDER — PREDNISONE 20 MG/1
40 TABLET ORAL DAILY
Qty: 10 TABLET | Refills: 0 | Status: SHIPPED | OUTPATIENT
Start: 2024-04-18 | End: 2024-04-23

## 2024-04-18 RX ORDER — AZITHROMYCIN 250 MG/1
TABLET, FILM COATED ORAL
Qty: 6 TABLET | Refills: 0 | Status: SHIPPED | OUTPATIENT
Start: 2024-04-18 | End: 2024-04-23

## 2024-04-18 NOTE — ED PROVIDER NOTES
Patient Seen in: [facility]      History     Chief Complaint   Patient presents with    Cough     Stated Complaint: Congestion ear and chest    Subjective:   HPI    This is a well-appearing 68-year-old who presents with cough, chest congestion, sinus pressure over the last 1 week.  Reports over the last few days cough and wheezing with shortness of breath has worsened.  No fever or chills.  Also complaining of right ear pain.  States it does improve after taking Motrin.  No mastoid tenderness.  No drainage from the ear.  Denies any chest pain.  Reports she does have asthma and very infrequently uses her inhaler but has been using it every 4 hours over the last 1 week with minimal relief.  Denies any lower extremity swelling.    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 Ovaries remain                Social History     Socioeconomic History    Marital status:    Tobacco Use    Smoking status: Never    Smokeless tobacco: Never   Vaping Use    Vaping status: Never Used   Substance and Sexual Activity    Alcohol use: Not Currently     Alcohol/week: 0.0 standard drinks of alcohol     Comment: wine, 1 glass occasionally    Drug use: Not Currently    Sexual activity: Not Currently   Other Topics Concern    Caffeine Concern Yes     Comment: coffee, 2 cups/day    Pt has a  pacemaker No    Pt has a defibrillator No    Reaction to local anesthetic No              Review of Systems   HENT:  Positive for ear pain.    Respiratory:  Positive for cough, shortness of breath and wheezing.    All other systems reviewed and are negative.      Positive for stated complaint: Congestion ear and chest  Other systems are as noted in HPI.  Constitutional and vital signs reviewed.      All other systems reviewed and negative except as noted above.    Physical Exam     ED Triage Vitals [04/18/24 1018]   /52   Pulse 74   Resp 20   Temp 98.9 °F (37.2 °C)   Temp src Temporal   SpO2 95 %   O2 Device None (Room air)       Current:/52   Pulse 74   Temp 98.9 °F (37.2 °C) (Temporal)   Resp 20   SpO2 95%         Physical Exam  Vitals and nursing note reviewed.   Constitutional:       General: She is awake. She is not in acute distress.     Appearance: Normal appearance. She is not ill-appearing, toxic-appearing or diaphoretic.   HENT:      Head: Normocephalic and atraumatic.      Right Ear: Ear canal and external ear normal. Tympanic membrane is erythematous.      Left Ear: Tympanic membrane, ear canal and external ear normal.      Nose: Nose normal.      Mouth/Throat:      Lips: Pink.      Mouth: Mucous membranes are moist.      Pharynx: Oropharynx is clear. Uvula midline. No pharyngeal swelling or posterior oropharyngeal erythema.   Eyes:      General: Lids are normal.      Extraocular Movements: Extraocular movements intact.      Conjunctiva/sclera: Conjunctivae normal.      Pupils: Pupils are equal, round, and reactive to light.   Cardiovascular:      Rate and Rhythm: Normal rate and regular rhythm.      Pulses: Normal pulses.      Heart sounds: Normal heart sounds.   Pulmonary:      Effort: Pulmonary effort is normal.      Breath sounds: Wheezing present.   Musculoskeletal:      Cervical back: Full passive range of motion without pain and normal range of motion.   Skin:     General: Skin is  warm and dry.      Capillary Refill: Capillary refill takes less than 2 seconds.   Neurological:      General: No focal deficit present.      Mental Status: She is alert and oriented to person, place, and time.   Psychiatric:         Mood and Affect: Mood normal.         Behavior: Behavior normal. Behavior is cooperative.         Thought Content: Thought content normal.         Judgment: Judgment normal.       ED Course     Labs Reviewed   RAPID SARS-COV-2 BY PCR - Normal     Chest x-ray, COVID.  If COVID-negative will give nebulizer here  COVID-negative.  Albuterol administered.  X-ray reviewed, no radiographic evidence of acute cardiopulmonary process.  Lungs clear bilaterally after albuterol nebulizer.  Patient reports significant improvement in symptoms.  XR CHEST PA + LAT CHEST (CPT=71046)    Result Date: 4/18/2024  CONCLUSION:   No radiographic evidence of acute cardiopulmonary process.    Dictated by (CST): Allison Abarca MD on 4/18/2024 at 11:20 AM     Finalized by (CST): Allison Abarca MD on 4/18/2024 at 11:22 AM          [facility]          Medical Decision Making  Patient is well-appearing exam, nontoxic appearance, exam as noted above.  Differential diagnoses including but not limited to pneumonia, bronchitis, otitis externa, otitis media, otalgia.  Chest x-ray reviewed, no evidence of pneumonia.  Patient with improvement in breath sounds after albuterol is administered.  Lungs clear bilaterally.  Right otitis media on exam.  Will treat with azithromycin as she does have an allergy to amoxicillin.  Will also place in a short course of steroid which I do believe would be beneficial.  She has tolerated this well in the past.  Close follow-up with the primary care provider is recommended.  Patient not hypoxic or tachycardic, vital signs stable at discharge.    Problems Addressed:  Acute suppurative otitis media of right ear without spontaneous rupture of tympanic membrane, recurrence not specified: acute  illness or injury  Wheezing: acute illness or injury    Amount and/or Complexity of Data Reviewed  Radiology: ordered and independent interpretation performed. Decision-making details documented in ED Course.  ECG/medicine tests: ordered and independent interpretation performed. Decision-making details documented in ED Course.    Risk  OTC drugs.        Disposition and Plan     Clinical Impression:  1. Acute suppurative otitis media of right ear without spontaneous rupture of tympanic membrane, recurrence not specified    2. Wheezing         Disposition:  Discharge  4/18/2024 11:34 am    Follow-up:  Alonzo Walton MD  [address] [place] [id number]  [phone]                Medications Prescribed:  Discharge Medication List as of 4/18/2024 11:34 AM

## 2024-06-09 ENCOUNTER — HOSPITAL ENCOUNTER (OUTPATIENT)
Dept: MRI IMAGING | Facility: HOSPITAL | Age: 69
End: 2024-06-09
Attending: NURSE PRACTITIONER
Payer: MEDICARE

## 2024-06-09 ENCOUNTER — HOSPITAL ENCOUNTER (OUTPATIENT)
Dept: MRI IMAGING | Facility: HOSPITAL | Age: 69
Discharge: HOME | End: 2024-06-09
Attending: NURSE PRACTITIONER
Payer: MEDICARE

## 2024-06-09 DIAGNOSIS — K76.9 HEPATIC LESION: ICD-10-CM

## 2024-06-09 PROCEDURE — 74183 MRI ABD W/O CNTR FLWD CNTR: CPT | Performed by: NURSE PRACTITIONER

## 2024-06-09 PROCEDURE — A9581 GADOXETATE DISODIUM INJ: HCPCS | Performed by: NURSE PRACTITIONER

## 2024-06-10 ENCOUNTER — TELEPHONE (OUTPATIENT)
Dept: GASTROENTEROLOGY | Facility: CLINIC | Age: 69
End: 2024-06-10

## 2024-06-10 NOTE — TELEPHONE ENCOUNTER
[facility] liver 6/10/24:  Impression   CONCLUSION:  1. Mild-to-moderate hepatic steatosis.  2. Benign hepatic cysts and/or biliary hamartomas.  3. Previously described segment 6 hepatic lesion is not discernible, and was related to a combination of benign perfusional variant and focal fatty sparing.  4. Evaluation slightly degraded by patient related motion artifact.           I contacted the pt and we reviewed her imaging:  Liver function testing normal 8/27/23.  Labs neg for acute viral hepatitis    Recommended:  Hep b vaccine with pcp  Healthy bmi  Low-fat diet  Avoid/limit nsaids, acetaminophen, alcohol  Monitor cholesterol, blood sugar, liver function testing with primary care and consider need for further work-up if indicated.    She verbalizes understanding and is in agreement with the plan

## 2024-08-08 ENCOUNTER — HOSPITAL ENCOUNTER (OUTPATIENT)
Age: 69
Discharge: HOME | End: 2024-08-08
Payer: MEDICARE

## 2024-08-08 VITALS
RESPIRATION RATE: 20 BRPM | HEART RATE: 56 BPM | SYSTOLIC BLOOD PRESSURE: 153 MMHG | TEMPERATURE: 98 F | OXYGEN SATURATION: 100 % | DIASTOLIC BLOOD PRESSURE: 84 MMHG

## 2024-08-08 DIAGNOSIS — N61.0 CELLULITIS OF RIGHT BREAST: Primary | ICD-10-CM
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
## 2024-08-08 PROCEDURE — 99213 OFFICE O/P EST LOW 20 MIN: CPT

## 2024-08-08 RX ORDER — DOXYCYCLINE HYCLATE 100 MG/1
100 CAPSULE ORAL 2 TIMES DAILY
Qty: 14 CAPSULE | Refills: 0 | Status: SHIPPED | OUTPATIENT
Start: 2024-08-08 | End: 2024-08-15

## 2024-08-08 NOTE — DISCHARGE INSTRUCTIONS
RECOMMEND PROBIOTIC DURING COURSE.     READDRESS IN 3 DAYS IF REDNESS EXTENDING OUTSIDE MARGINS WITHOUT OR WITHOUT FEVER DEVELOPMENT.

## 2024-08-08 NOTE — ED PROVIDER NOTES
Chief Complaint   Patient presents with    Insect Bite       HPI:     Isela Zavala is a 68 year old female who presents for evaluation of concern of insect bite 2 days ago while gardening.  Notes pain along the right breast tissue noting superficial punctures 2 days ago with increasing redness over the last few days.  Notes associated warmth without drainage.  Denies history of complicated skin infection including MRSA.  Denies associated headache dizziness fevers chills chest pain shortness of breath abdominal pain vomiting or diarrhea.      PFSH    PFSH asessment screens reviewed and agree.  Nurses notes reviewed I agree with documentation.    Family History   Problem Relation Age of Onset    Cancer Father         liver (cause of death)    Diabetes Father     Glaucoma Mother 91    Other (Other) Mother     Cancer Other         family h/o liver    Diabetes Other         family h/o     Cancer Self 56        basal cell    Breast Cancer Neg      Family history reviewed with patient/caregiver and is not pertinent to presenting problem.  Social History     Socioeconomic History    Marital status:      Spouse name: Not on file    Number of children: Not on file    Years of education: Not on file    Highest education level: Not on file   Occupational History    Not on file   Tobacco Use    Smoking status: Never    Smokeless tobacco: Never   Vaping Use    Vaping status: Never Used   Substance and Sexual Activity    Alcohol use: Not Currently     Alcohol/week: 0.0 standard drinks of alcohol     Comment: wine, 1 glass occasionally    Drug use: Not Currently    Sexual activity: Not Currently   Other Topics Concern     Service Not Asked    Blood Transfusions Not Asked    Caffeine Concern Yes     Comment: coffee, 2 cups/day    Occupational Exposure Not Asked    Hobby Hazards Not Asked    Sleep Concern Not Asked    Stress Concern Not Asked    Weight Concern Not Asked    Special Diet Not Asked    Back Care Not  Asked    Exercise Not Asked    Bike Helmet Not Asked    Seat Belt Not Asked    Self-Exams Not Asked    Grew up on a farm Not Asked    History of tanning Not Asked    Outdoor occupation Not Asked    Pt has a pacemaker No    Pt has a defibrillator No    Breast feeding Not Asked    Reaction to local anesthetic No   Social History Narrative    Not on file     Social Determinants of Health     Financial Resource Strain: Not on file   Food Insecurity: Not on file   Transportation Needs: Not on file   Physical Activity: Not on file   Stress: Not on file   Social Connections: Not on file   Housing Stability: Not on file         ROS:   Positive for stated complaint: Rash.  All other systems reviewed and negative except as noted above.  Constitutional and Vital Signs Reviewed.      Physical Exam:     Findings:    /84   Pulse 56   Temp 97.8 °F (36.6 °C) (Temporal)   Resp 20   SpO2 100%   GENERAL: well developed, well nourished, well hydrated, no distress  SKIN: good skin turgor, superficial punctures less than 0.25 cm x 0.25 cm right lateral breast with surrounding erythema and slight warmth dimensions 5 cm x 5 cm.  No induration fluctuance or dehiscence.  NECK: supple, no adenopathy  EXTREMITIES: no cyanosis or edema. LOPEZ without difficulty  HEAD: normocephalic, atraumatic  EYES: sclera non icteric bilateral, conjunctiva clear  NOSE: nasal turbinates: pink, normal mucosa  NEURO: No focal deficits  PSYCH: Alert and oriented x3.  Answering questions appropriately.  Mood appropriate.    MDM/Assessment/Plan:   Orders for this encounter:    Orders Placed This Encounter    doxycycline 100 MG Oral Cap     Sig: Take 1 capsule (100 mg total) by mouth 2 (two) times daily for 7 days.     Dispense:  14 capsule     Refill:  0       Labs performed this visit:  No results found for this or any previous visit (from the past 10 hour(s)).    MDM:  Patient erythema marked, instructed outpatient follow-up as well as medical management,  agrees to readdress as needed, happy with plan of care.    Diagnosis:    ICD-10-CM    1. Cellulitis of right breast  N61.0       2. Insect bite, unspecified site, initial encounter  W57.XXXA           All results reviewed and discussed with patient.  See AVS for detailed discharge instructions for your condition today.    Follow Up with:  Alonzo Walton MD  [address] [place] [id number]  [phone]    Schedule an appointment as soon as possible for a visit in 3 days  As needed, For wound re-check, If symptoms worsen

## 2024-08-08 NOTE — ED INITIAL ASSESSMENT (HPI)
Presents with insect bite to right breast while gardening 3 days ago. + itching. Using hydrocortisone cream.

## 2024-09-09 ENCOUNTER — OFFICE VISIT (OUTPATIENT)
Dept: INTERNAL MEDICINE CLINIC | Facility: CLINIC | Age: 69
End: 2024-09-09
Payer: MEDICARE

## 2024-09-09 VITALS
BODY MASS INDEX: 31.07 KG/M2 | WEIGHT: 182 LBS | SYSTOLIC BLOOD PRESSURE: 122 MMHG | HEIGHT: 64 IN | HEART RATE: 83 BPM | OXYGEN SATURATION: 99 % | DIASTOLIC BLOOD PRESSURE: 82 MMHG

## 2024-09-09 DIAGNOSIS — Z00.00 ANNUAL PHYSICAL EXAM: Primary | ICD-10-CM

## 2024-09-09 DIAGNOSIS — Z90.710 H/O: HYSTERECTOMY: ICD-10-CM

## 2024-09-09 DIAGNOSIS — I38 VALVULAR HEART DISEASE: ICD-10-CM

## 2024-09-09 DIAGNOSIS — Z78.0 ASYMPTOMATIC MENOPAUSE: ICD-10-CM

## 2024-09-09 DIAGNOSIS — E78.5 HYPERLIPIDEMIA, UNSPECIFIED HYPERLIPIDEMIA TYPE: ICD-10-CM

## 2024-09-09 DIAGNOSIS — K21.9 GASTROESOPHAGEAL REFLUX DISEASE, UNSPECIFIED WHETHER ESOPHAGITIS PRESENT: ICD-10-CM

## 2024-09-09 DIAGNOSIS — Z12.31 ENCOUNTER FOR SCREENING MAMMOGRAM FOR MALIGNANT NEOPLASM OF BREAST: ICD-10-CM

## 2024-09-09 DIAGNOSIS — Q24.8: ICD-10-CM

## 2024-09-09 DIAGNOSIS — M85.80 OSTEOPENIA, UNSPECIFIED LOCATION: ICD-10-CM

## 2024-09-09 DIAGNOSIS — H93.8X9 EAR CONGESTION, UNSPECIFIED LATERALITY: ICD-10-CM

## 2024-09-09 DIAGNOSIS — R73.03 PREDIABETES: ICD-10-CM

## 2024-09-09 DIAGNOSIS — R22.2 SUPRACLAVICULAR FOSSA FULLNESS: ICD-10-CM

## 2024-09-09 DIAGNOSIS — K76.9 LIVER LESION: ICD-10-CM

## 2024-09-09 DIAGNOSIS — I35.1 AORTIC VALVE INSUFFICIENCY, ETIOLOGY OF CARDIAC VALVE DISEASE UNSPECIFIED: ICD-10-CM

## 2024-09-09 DIAGNOSIS — Z85.828 HISTORY OF BASAL CELL CARCINOMA OF SKIN: ICD-10-CM

## 2024-09-09 DIAGNOSIS — N18.9 CHRONIC KIDNEY DISEASE, UNSPECIFIED CKD STAGE: ICD-10-CM

## 2024-09-09 LAB
ALBUMIN SERPL-MCNC: 4.3 G/DL (ref 3.2–4.8)
ALBUMIN/GLOB SERPL: 1.5 {RATIO} (ref 1–2)
ALP LIVER SERPL-CCNC: 74 U/L
ALT SERPL-CCNC: 37 U/L
ANION GAP SERPL CALC-SCNC: 7 MMOL/L (ref 0–18)
AST SERPL-CCNC: 33 U/L (ref ?–34)
BASOPHILS # BLD AUTO: 0.02 X10(3) UL (ref 0–0.2)
BASOPHILS NFR BLD AUTO: 0.4 %
BILIRUB SERPL-MCNC: 0.6 MG/DL (ref 0.2–1.1)
BILIRUB UR QL: NEGATIVE
BUN BLD-MCNC: 19 MG/DL (ref 9–23)
BUN/CREAT SERPL: 19.2 (ref 10–20)
CALCIUM BLD-MCNC: 9.8 MG/DL (ref 8.7–10.4)
CHLORIDE SERPL-SCNC: 108 MMOL/L (ref 98–112)
CHOLEST SERPL-MCNC: 200 MG/DL (ref ?–200)
CLARITY UR: CLEAR
CO2 SERPL-SCNC: 28 MMOL/L (ref 21–32)
CREAT BLD-MCNC: 0.99 MG/DL
DEPRECATED RDW RBC AUTO: 45.3 FL (ref 35.1–46.3)
EGFRCR SERPLBLD CKD-EPI 2021: 62 ML/MIN/1.73M2 (ref 60–?)
EOSINOPHIL # BLD AUTO: 0.15 X10(3) UL (ref 0–0.7)
EOSINOPHIL NFR BLD AUTO: 2.9 %
ERYTHROCYTE [DISTWIDTH] IN BLOOD BY AUTOMATED COUNT: 12.9 % (ref 11–15)
EST. AVERAGE GLUCOSE BLD GHB EST-MCNC: 120 MG/DL (ref 68–126)
FASTING PATIENT LIPID ANSWER: YES
FASTING STATUS PATIENT QL REPORTED: YES
GLOBULIN PLAS-MCNC: 2.9 G/DL (ref 2–3.5)
GLUCOSE BLD-MCNC: 96 MG/DL (ref 70–99)
GLUCOSE UR-MCNC: NORMAL MG/DL
HBA1C MFR BLD: 5.8 % (ref ?–5.7)
HCT VFR BLD AUTO: 44.9 %
HDLC SERPL-MCNC: 52 MG/DL (ref 40–59)
HGB BLD-MCNC: 14.4 G/DL
HGB UR QL STRIP.AUTO: NEGATIVE
IMM GRANULOCYTES # BLD AUTO: 0.01 X10(3) UL (ref 0–1)
IMM GRANULOCYTES NFR BLD: 0.2 %
KETONES UR-MCNC: NEGATIVE MG/DL
LDLC SERPL CALC-MCNC: 132 MG/DL (ref ?–100)
LEUKOCYTE ESTERASE UR QL STRIP.AUTO: 25
LYMPHOCYTES # BLD AUTO: 1.67 X10(3) UL (ref 1–4)
LYMPHOCYTES NFR BLD AUTO: 32.4 %
MCH RBC QN AUTO: 30.8 PG (ref 26–34)
MCHC RBC AUTO-ENTMCNC: 32.1 G/DL (ref 31–37)
MCV RBC AUTO: 96.1 FL
MONOCYTES # BLD AUTO: 0.45 X10(3) UL (ref 0.1–1)
MONOCYTES NFR BLD AUTO: 8.7 %
NEUTROPHILS # BLD AUTO: 2.86 X10 (3) UL (ref 1.5–7.7)
NEUTROPHILS # BLD AUTO: 2.86 X10(3) UL (ref 1.5–7.7)
NEUTROPHILS NFR BLD AUTO: 55.4 %
NITRITE UR QL STRIP.AUTO: NEGATIVE
NONHDLC SERPL-MCNC: 148 MG/DL (ref ?–130)
OSMOLALITY SERPL CALC.SUM OF ELEC: 298 MOSM/KG (ref 275–295)
PH UR: 5 [PH] (ref 5–8)
PLATELET # BLD AUTO: 227 10(3)UL (ref 150–450)
POTASSIUM SERPL-SCNC: 4.6 MMOL/L (ref 3.5–5.1)
PROT SERPL-MCNC: 7.2 G/DL (ref 5.7–8.2)
PROT UR-MCNC: NEGATIVE MG/DL
RBC # BLD AUTO: 4.67 X10(6)UL
SODIUM SERPL-SCNC: 143 MMOL/L (ref 136–145)
SP GR UR STRIP: 1.02 (ref 1–1.03)
TRIGL SERPL-MCNC: 89 MG/DL (ref 30–149)
TSI SER-ACNC: 2 MIU/ML (ref 0.55–4.78)
UROBILINOGEN UR STRIP-ACNC: NORMAL
VIT D+METAB SERPL-MCNC: 54.7 NG/ML (ref 30–100)
VLDLC SERPL CALC-MCNC: 16 MG/DL (ref 0–30)
WBC # BLD AUTO: 5.2 X10(3) UL (ref 4–11)

## 2024-09-09 PROCEDURE — 84443 ASSAY THYROID STIM HORMONE: CPT | Performed by: INTERNAL MEDICINE

## 2024-09-09 PROCEDURE — 80061 LIPID PANEL: CPT | Performed by: INTERNAL MEDICINE

## 2024-09-09 PROCEDURE — 80053 COMPREHEN METABOLIC PANEL: CPT | Performed by: INTERNAL MEDICINE

## 2024-09-09 PROCEDURE — 85025 COMPLETE CBC W/AUTO DIFF WBC: CPT | Performed by: INTERNAL MEDICINE

## 2024-09-09 PROCEDURE — 82306 VITAMIN D 25 HYDROXY: CPT | Performed by: INTERNAL MEDICINE

## 2024-09-09 PROCEDURE — 81001 URINALYSIS AUTO W/SCOPE: CPT | Performed by: INTERNAL MEDICINE

## 2024-09-09 PROCEDURE — 83036 HEMOGLOBIN GLYCOSYLATED A1C: CPT | Performed by: INTERNAL MEDICINE

## 2024-09-09 PROCEDURE — 87086 URINE CULTURE/COLONY COUNT: CPT | Performed by: INTERNAL MEDICINE

## 2024-09-09 RX ORDER — CETIRIZINE HYDROCHLORIDE, PSEUDOEPHEDRINE HYDROCHLORIDE 5; 120 MG/1; MG/1
1 TABLET, FILM COATED, EXTENDED RELEASE ORAL 2 TIMES DAILY
Qty: 14 TABLET | Refills: 0 | Status: SHIPPED | OUTPATIENT
Start: 2024-09-09 | End: 2024-09-16

## 2024-09-09 NOTE — PROGRESS NOTES
Subjective:   Isela Zavala is a 68 year old female who presents for a Medicare Subsequent Annual Wellness visit (Pt already had Initial Annual Wellness)       No chest pain no sob no abdominal pain  No diarrhea or constipation   No fever or chills   No urinary complaints        Reports that he family member noticed fullness in the supraclavicular area   No pain   No sob   No chest or cough         No smoking never been a smoker   Alcohol : once in a great while   Exercise : membership at the [employer]   Trying to go 3-4 times per week       She is taking MV and 5000 U vitamin d twice weekly     Family history of cancer : dad liver cancer     History/Other:   Fall Risk Assessment:   She has been screened for Falls and is low risk.      Cognitive Assessment:   She had a completely normal cognitive assessment - see flowsheet entries     Functional Ability/Status:   Isela Zavala has a completely normal functional assessment. See flowsheet for details.      Depression Screening (PHQ):  PHQ-2 SCORE: 0  , done 9/9/2024        Advanced Directives:   She does NOT have a Living Will. [Do you have a living will?: No]  She does NOT have a Power of  for Health Care. [Do you have a healthcare power of ?: No]        Patient Active Problem List   Diagnosis    Routine general medical examination at a health care facility    Aortic valve insufficiency    Asymptomatic menopause    Osteopenia    Prediabetes    Valvular heart disease    H/O: hysterectomy    Hyperlipidemia    History of basal cell carcinoma of skin    Abnormality of heart valve (HCC)    Abdominal pain    Encounter for screening mammogram for malignant neoplasm of breast    Gastroesophageal reflux disease    Constipation    Chronic kidney disease    Liver lesion     Allergies:  She is allergic to amoxicillin.    Current Medications:  Outpatient Medications Marked as Taking for the 9/9/24 encounter (Office Visit) with Alonzo Walton MD   Medication Sig     albuterol 108 (90 Base) MCG/ACT Inhalation Aero Soln Inhale 2 puffs into the lungs every 6 (six) hours as needed for Wheezing or Shortness of Breath.    albuterol 108 (90 Base) MCG/ACT Inhalation Aero Soln Inhale 2 puffs into the lungs every 6 (six) hours as needed for Wheezing or Shortness of Breath.    ergocalciferol 1.25 MG (64219 UT) Oral Cap Take 1 capsule (50,000 Units total) by mouth once a week.    lisinopril (PRINIVIL,ZESTRIL) 5 MG Oral Tab Take 1 tablet (5 mg total) by mouth daily.    cholecalciferol 25 MCG (1000 UT) Oral Cap Take 1 capsule (1,000 Units total) by mouth daily.       Medical History:  She  has a past medical history of Basal cell carcinoma, Essential tremor, Heart valve disease, High blood pressure, Hyperlipidemia (12/19/2020), Lipid screening (11/23/2013), Liver lesion (10/28/2023), Osteoporosis screening (03/30/2011), Pelvic relaxation, Skin cancer, Unspecified essential hypertension, and Visual impairment.  Surgical History:  She  has a past surgical history that includes colonoscopy (7/22/2011); hysterectomy (1999); and colonoscopy (N/A, 4/7/2023).   Family History:  Her family history includes Cancer in her father and another family member; Cancer (age of onset: 56) in her self; Diabetes in her father and another family member; Glaucoma (age of onset: 91) in her mother; Other in her mother.  Social History:  She  reports that she has never smoked. She has never used smokeless tobacco. She reports that she does not currently use alcohol. She reports that she does not currently use drugs.    Tobacco:  She has never smoked tobacco.    CAGE Alcohol Screen:   CAGE screening score of 0 on 9/9/2024, showing low risk of alcohol abuse.      Patient Care Team:  Alonzo Walton MD as PCP - General (Internal Medicine)    Review of Systems  A comprehensive 10 point review of systems was completed.  Pertinent positives and negatives noted in the the HPI.     Objective:   Physical Exam  GENERAL: well  developed, well nourished,in no apparent distress  SKIN: no rashes,no suspicious lesions  HEENT: atraumatic, normocephalic,throat are clear  EYES:Conjunctiva are clear  NECK: supple,no adenopathy  BREAST: no dominant or suspicious mass, no nipple discharge  LUNGS: clear to auscultation  CARDIO: RRR without murmur  GI: good BS's,no masses, HSM or tenderness  EXTREMITIES: no edema   Pelvic exam : dry vagina  Normal bimanual exam   Normal otherwise       /82   Pulse 83   Ht 5' 4\" (1.626 m)   Wt 182 lb (82.6 kg)   SpO2 99%   BMI 31.24 kg/m²  Estimated body mass index is 31.24 kg/m² as calculated from the following:    Height as of this encounter: 5' 4\" (1.626 m).    Weight as of this encounter: 182 lb (82.6 kg).    Medicare Hearing Assessment:   Hearing Screening    Screening Method: Questionnaire  I have a problem hearing over the telephone: No I have trouble following the conversations when two or more people are talking at the same time: No   I have trouble understanding things on the TV: No I have to strain to understand conversations: No   I have to worry about missing the telephone ring or doorbell: No I have trouble hearing conversations in a noisy background such as a crowded room or restaurant: No   I get confused about where sounds come from: No I misunderstand some words in a sentence and need to ask people to repeat themselves: No   I especially have trouble understanding the speech of women and children: No I have trouble understanding the speaker in a large room such as at a meeting or place of [place]: No   Many people I talk to seem to mumble (or don't speak clearly): No People get annoyed because I misunderstand what they say: No   I misunderstand what others are saying and make inappropriate responses: No I avoid social activities because I cannot hear well and fear I will reply improperly: No   Family members and friends have told me they think I may have hearing loss: No             Visual  Acuity:   Right Eye Visual Acuity: Corrected Right Eye Chart Acuity: 20/25   Left Eye Visual Acuity: Corrected Left Eye Chart Acuity: 20/25   Both Eyes Visual Acuity: Corrected Both Eyes Chart Acuity: 20/25   Able To Tolerate Visual Acuity: Yes        Assessment & Plan:   Isela Zavala is a 68 year old female who presents for a Medicare Assessment.       ICD-10-CM    1. Annual physical exam  Z00.00 Cardio Referral - Internal     [facility] MALIK 2D+3D SCREENING BILAT (CPT=77067/30033)     CBC With Differential With Platelet     Comp Metabolic Panel (14)     Lipid Panel     Hemoglobin A1C     TSH W Reflex To Free T4     Vitamin D [E]     CT STN CHEST (ALL WO IV) (CPT=70490/24714)     Urinalysis with Culture Reflex [E]     cetirizine-pseudoephedrine ER 5-120 MG Oral Tablet 12 Hr     CBC With Differential With Platelet     Comp Metabolic Panel (14)     Lipid Panel     Hemoglobin A1C     TSH W Reflex To Free T4     Vitamin D [E]     Urinalysis with Culture Reflex [E]      2. History of basal cell carcinoma of skin  Z85.828 Cardio Referral - Internal     [facility] MLAIK 2D+3D SCREENING BILAT (CPT=77067/98768)     CBC With Differential With Platelet     Comp Metabolic Panel (14)     Lipid Panel     Hemoglobin A1C     TSH W Reflex To Free T4     Vitamin D [E]     CT STN CHEST (ALL WO IV) (CPT=70490/94563)     Urinalysis with Culture Reflex [E]     cetirizine-pseudoephedrine ER 5-120 MG Oral Tablet 12 Hr     CBC With Differential With Platelet     Comp Metabolic Panel (14)     Lipid Panel     Hemoglobin A1C     TSH W Reflex To Free T4     Vitamin D [E]     Urinalysis with Culture Reflex [E]      3. H/O: hysterectomy  Z90.710 Cardio Referral - Internal     [facility] MALIK 2D+3D SCREENING BILAT (CPT=77067/03403)     CBC With Differential With Platelet     Comp Metabolic Panel (14)     Lipid Panel     Hemoglobin A1C     TSH W Reflex To Free T4     Vitamin D [E]     CT STN CHEST (ALL WO IV) (CPT=70490/56358)     Urinalysis with Culture Reflex [E]      cetirizine-pseudoephedrine ER 5-120 MG Oral Tablet 12 Hr     CBC With Differential With Platelet     Comp Metabolic Panel (14)     Lipid Panel     Hemoglobin A1C     TSH W Reflex To Free T4     Vitamin D [E]     Urinalysis with Culture Reflex [E]      4. Hyperlipidemia, unspecified hyperlipidemia type  E78.5 Cardio Referral - Internal     [facility] MALIK 2D+3D SCREENING BILAT (CPT=77067/29625)     CBC With Differential With Platelet     Comp Metabolic Panel (14)     Lipid Panel     Hemoglobin A1C     TSH W Reflex To Free T4     Vitamin D [E]     CT STN CHEST (ALL WO IV) (CPT=70490/52290)     Urinalysis with Culture Reflex [E]     cetirizine-pseudoephedrine ER 5-120 MG Oral Tablet 12 Hr     CBC With Differential With Platelet     Comp Metabolic Panel (14)     Lipid Panel     Hemoglobin A1C     TSH W Reflex To Free T4     Vitamin D [E]     Urinalysis with Culture Reflex [E]      5. Osteopenia, unspecified location  M85.80 Cardio Referral - Internal     [facility] MALIK 2D+3D SCREENING BILAT (CPT=77067/00651)     CBC With Differential With Platelet     Comp Metabolic Panel (14)     Lipid Panel     Hemoglobin A1C     TSH W Reflex To Free T4     Vitamin D [E]     CT STN CHEST (ALL WO IV) (CPT=70490/91403)     Urinalysis with Culture Reflex [E]     cetirizine-pseudoephedrine ER 5-120 MG Oral Tablet 12 Hr     CBC With Differential With Platelet     Comp Metabolic Panel (14)     Lipid Panel     Hemoglobin A1C     TSH W Reflex To Free T4     Vitamin D [E]     Urinalysis with Culture Reflex [E]      6. Encounter for screening mammogram for malignant neoplasm of breast  Z12.31 Cardio Referral - Internal     [facility] MALIK 2D+3D SCREENING BILAT (CPT=77067/53423)     CBC With Differential With Platelet     Comp Metabolic Panel (14)     Lipid Panel     Hemoglobin A1C     TSH W Reflex To Free T4     Vitamin D [E]     CT STN CHEST (ALL WO IV) (CPT=70490/95771)     Urinalysis with Culture Reflex [E]     cetirizine-pseudoephedrine ER 5-120 MG Oral  Tablet 12 Hr     CBC With Differential With Platelet     Comp Metabolic Panel (14)     Lipid Panel     Hemoglobin A1C     TSH W Reflex To Free T4     Vitamin D [E]     Urinalysis with Culture Reflex [E]      7. Valvular heart disease  I38 Cardio Referral - Internal     [facility] MALIK 2D+3D SCREENING BILAT (CPT=77067/15788)     CBC With Differential With Platelet     Comp Metabolic Panel (14)     Lipid Panel     Hemoglobin A1C     TSH W Reflex To Free T4     Vitamin D [E]     CT STN CHEST (ALL WO IV) (CPT=70490/28667)     Urinalysis with Culture Reflex [E]     cetirizine-pseudoephedrine ER 5-120 MG Oral Tablet 12 Hr     CBC With Differential With Platelet     Comp Metabolic Panel (14)     Lipid Panel     Hemoglobin A1C     TSH W Reflex To Free T4     Vitamin D [E]     Urinalysis with Culture Reflex [E]      8. Prediabetes  R73.03 Cardio Referral - Internal     [facility] MALIK 2D+3D SCREENING BILAT (CPT=77067/70606)     CBC With Differential With Platelet     Comp Metabolic Panel (14)     Lipid Panel     Hemoglobin A1C     TSH W Reflex To Free T4     Vitamin D [E]     CT STN CHEST (ALL WO IV) (CPT=70490/77125)     Urinalysis with Culture Reflex [E]     cetirizine-pseudoephedrine ER 5-120 MG Oral Tablet 12 Hr     CBC With Differential With Platelet     Comp Metabolic Panel (14)     Lipid Panel     Hemoglobin A1C     TSH W Reflex To Free T4     Vitamin D [E]     Urinalysis with Culture Reflex [E]      9. Supraclavicular fossa fullness  R22.2 Cardio Referral - Internal     [facility] MALIK 2D+3D SCREENING BILAT (CPT=77067/53029)     CBC With Differential With Platelet     Comp Metabolic Panel (14)     Lipid Panel     Hemoglobin A1C     TSH W Reflex To Free T4     Vitamin D [E]     CT STN CHEST (ALL WO IV) (CPT=70490/73162)     Urinalysis with Culture Reflex [E]     cetirizine-pseudoephedrine ER 5-120 MG Oral Tablet 12 Hr     CBC With Differential With Platelet     Comp Metabolic Panel (14)     Lipid Panel     Hemoglobin A1C     TSH W Reflex  To Free T4     Vitamin D [E]     Urinalysis with Culture Reflex [E]     CANCELED: CT STN/CHEST W CONTRAST (CPT=70491/18716)      10. Abnormality of heart valve (HCC)  Q24.8 Cardio Referral - Internal     [facility] MALIK 2D+3D SCREENING BILAT (CPT=77067/71917)     CBC With Differential With Platelet     Comp Metabolic Panel (14)     Lipid Panel     Hemoglobin A1C     TSH W Reflex To Free T4     Vitamin D [E]     CT STN CHEST (ALL WO IV) (CPT=70490/87042)     Urinalysis with Culture Reflex [E]     cetirizine-pseudoephedrine ER 5-120 MG Oral Tablet 12 Hr     CBC With Differential With Platelet     Comp Metabolic Panel (14)     Lipid Panel     Hemoglobin A1C     TSH W Reflex To Free T4     Vitamin D [E]     Urinalysis with Culture Reflex [E]      11. Aortic valve insufficiency, etiology of cardiac valve disease unspecified  I35.1 Cardio Referral - Internal     [facility] MALIK 2D+3D SCREENING BILAT (CPT=77067/23153)     CBC With Differential With Platelet     Comp Metabolic Panel (14)     Lipid Panel     Hemoglobin A1C     TSH W Reflex To Free T4     Vitamin D [E]     CT STN CHEST (ALL WO IV) (CPT=70490/91332)     Urinalysis with Culture Reflex [E]     cetirizine-pseudoephedrine ER 5-120 MG Oral Tablet 12 Hr     CBC With Differential With Platelet     Comp Metabolic Panel (14)     Lipid Panel     Hemoglobin A1C     TSH W Reflex To Free T4     Vitamin D [E]     Urinalysis with Culture Reflex [E]      12. Liver lesion  K76.9 Cardio Referral - Internal     [facility] MALIK 2D+3D SCREENING BILAT (CPT=77067/66925)     CBC With Differential With Platelet     Comp Metabolic Panel (14)     Lipid Panel     Hemoglobin A1C     TSH W Reflex To Free T4     Vitamin D [E]     CT STN CHEST (ALL WO IV) (CPT=70490/16560)     Urinalysis with Culture Reflex [E]     cetirizine-pseudoephedrine ER 5-120 MG Oral Tablet 12 Hr     CBC With Differential With Platelet     Comp Metabolic Panel (14)     Lipid Panel     Hemoglobin A1C     TSH W Reflex To Free T4      Vitamin D [E]     Urinalysis with Culture Reflex [E]      13. Gastroesophageal reflux disease, unspecified whether esophagitis present  K21.9 Cardio Referral - Internal     [facility] MALIK 2D+3D SCREENING BILAT (CPT=77067/91735)     CBC With Differential With Platelet     Comp Metabolic Panel (14)     Lipid Panel     Hemoglobin A1C     TSH W Reflex To Free T4     Vitamin D [E]     CT STN CHEST (ALL WO IV) (CPT=70490/89253)     Urinalysis with Culture Reflex [E]     cetirizine-pseudoephedrine ER 5-120 MG Oral Tablet 12 Hr     CBC With Differential With Platelet     Comp Metabolic Panel (14)     Lipid Panel     Hemoglobin A1C     TSH W Reflex To Free T4     Vitamin D [E]     Urinalysis with Culture Reflex [E]      14. Asymptomatic menopause  Z78.0 Cardio Referral - Internal     [facility] MALIK 2D+3D SCREENING BILAT (CPT=77067/21520)     CBC With Differential With Platelet     Comp Metabolic Panel (14)     Lipid Panel     Hemoglobin A1C     TSH W Reflex To Free T4     Vitamin D [E]     CT STN CHEST (ALL WO IV) (CPT=70490/19916)     Urinalysis with Culture Reflex [E]     cetirizine-pseudoephedrine ER 5-120 MG Oral Tablet 12 Hr     CBC With Differential With Platelet     Comp Metabolic Panel (14)     Lipid Panel     Hemoglobin A1C     TSH W Reflex To Free T4     Vitamin D [E]     Urinalysis with Culture Reflex [E]      15. Chronic kidney disease, unspecified CKD stage  N18.9 Cardio Referral - Internal     [facility] MALIK 2D+3D SCREENING BILAT (CPT=77067/22904)     CBC With Differential With Platelet     Comp Metabolic Panel (14)     Lipid Panel     Hemoglobin A1C     TSH W Reflex To Free T4     Vitamin D [E]     CT STN CHEST (ALL WO IV) (CPT=70490/68581)     Urinalysis with Culture Reflex [E]     cetirizine-pseudoephedrine ER 5-120 MG Oral Tablet 12 Hr     CBC With Differential With Platelet     Comp Metabolic Panel (14)     Lipid Panel     Hemoglobin A1C     TSH W Reflex To Free T4     Vitamin D [E]     Urinalysis with Culture Reflex [E]       16. Ear congestion, unspecified laterality  H93.8X9          Diet and exercise   Self breast exam   Sun screen recommended   Fasting blood work   Zyrtec d for ear congestion can add flonase later if no relief of the symptoms   Discussed zyrtec d might elevate the BP   Mammogram screening   Up to date with colonoscopy   Did see GI for the liver lesion   Up to date with vaccinations   Valvular  heart disease : she would like to see another cardiologist   Referral to cardiology for evaluation   Reviewed last echo   Follow up with derm as scheduled         Supraclavicular fullness : E/M  CT neck and chest ( wo contrast) since with CKD         No follow-ups on file.     Alonzo Walton MD, 9/9/2024     Supplementary Documentation:   General Health:  In the past six months, have you lost more than 10 pounds without trying?: 2 - No  Has your appetite been poor?: No  Type of Diet: Balanced  How does the patient maintain a good energy level?: Daily Walks  How would you describe your daily physical activity?: Moderate  How would you describe your current health state?: Good  How do you maintain positive mental well-being?: Social Interaction;Puzzles;Visiting Friends;Visiting Family  On a scale of 0 to 10, with 0 being no pain and 10 being severe pain, what is your pain level?: 0 - (None)  In the past six months, have you experienced urine leakage?: 0-No  At any time do you feel concerned for the safety/well-being of yourself and/or your children, in your home or elsewhere?: No  Have you had any immunizations at another office such as Influenza, Hepatitis B, Tetanus, or Pneumococcal?: No    Health Maintenance   Topic Date Due    Annual Depression Screening  01/01/2024    Annual Physical  08/26/2024    COVID-19 Vaccine (3 - 2023-24 season) 09/01/2024    Mammogram  10/19/2024    Influenza Vaccine (1) 10/01/2024    Colorectal Cancer Screening  04/07/2026    DEXA Scan  Completed    Fall Risk Screening (Annual)  Completed     Pneumococcal Vaccine: 65+ Years  Completed    Zoster Vaccines  Completed        Discussed E/M code + Z 00 code since with other complaints / work up

## 2024-09-10 RX ORDER — ROSUVASTATIN CALCIUM 5 MG/1
5 TABLET, COATED ORAL NIGHTLY
Qty: 90 TABLET | Refills: 1 | Status: SHIPPED | OUTPATIENT
Start: 2024-09-10 | End: 2024-12-09

## 2024-09-18 ENCOUNTER — HOSPITAL ENCOUNTER (OUTPATIENT)
Dept: CT IMAGING | Facility: HOSPITAL | Age: 69
Discharge: HOME | End: 2024-09-18
Attending: INTERNAL MEDICINE
Payer: MEDICARE

## 2024-09-18 DIAGNOSIS — R22.2 SUPRACLAVICULAR FOSSA FULLNESS: ICD-10-CM

## 2024-09-18 PROCEDURE — 71260 CT THORAX DX C+: CPT | Performed by: INTERNAL MEDICINE

## 2024-09-18 PROCEDURE — 70491 CT SOFT TISSUE NECK W/DYE: CPT | Performed by: INTERNAL MEDICINE

## 2024-10-22 ENCOUNTER — HOSPITAL ENCOUNTER (OUTPATIENT)
Dept: MAMMOGRAPHY | Facility: HOSPITAL | Age: 69
Discharge: HOME | End: 2024-10-22
Attending: INTERNAL MEDICINE
Payer: MEDICARE

## 2024-10-22 DIAGNOSIS — E78.5 HYPERLIPIDEMIA, UNSPECIFIED HYPERLIPIDEMIA TYPE: ICD-10-CM

## 2024-10-22 DIAGNOSIS — R73.03 PREDIABETES: ICD-10-CM

## 2024-10-22 DIAGNOSIS — Q24.8: ICD-10-CM

## 2024-10-22 DIAGNOSIS — R22.2 SUPRACLAVICULAR FOSSA FULLNESS: ICD-10-CM

## 2024-10-22 DIAGNOSIS — M85.80 OSTEOPENIA, UNSPECIFIED LOCATION: ICD-10-CM

## 2024-10-22 DIAGNOSIS — Z78.0 ASYMPTOMATIC MENOPAUSE: ICD-10-CM

## 2024-10-22 DIAGNOSIS — Z00.00 ANNUAL PHYSICAL EXAM: ICD-10-CM

## 2024-10-22 DIAGNOSIS — K21.9 GASTROESOPHAGEAL REFLUX DISEASE, UNSPECIFIED WHETHER ESOPHAGITIS PRESENT: ICD-10-CM

## 2024-10-22 DIAGNOSIS — Z12.31 ENCOUNTER FOR SCREENING MAMMOGRAM FOR MALIGNANT NEOPLASM OF BREAST: ICD-10-CM

## 2024-10-22 DIAGNOSIS — I38 VALVULAR HEART DISEASE: ICD-10-CM

## 2024-10-22 DIAGNOSIS — I35.1 AORTIC VALVE INSUFFICIENCY, ETIOLOGY OF CARDIAC VALVE DISEASE UNSPECIFIED: ICD-10-CM

## 2024-10-22 DIAGNOSIS — K76.9 LIVER LESION: ICD-10-CM

## 2024-10-22 DIAGNOSIS — Z85.828 HISTORY OF BASAL CELL CARCINOMA OF SKIN: ICD-10-CM

## 2024-10-22 DIAGNOSIS — Z90.710 H/O: HYSTERECTOMY: ICD-10-CM

## 2024-10-22 DIAGNOSIS — N18.9 CHRONIC KIDNEY DISEASE, UNSPECIFIED CKD STAGE: ICD-10-CM

## 2024-10-22 PROCEDURE — 77063 BREAST TOMOSYNTHESIS BI: CPT | Performed by: INTERNAL MEDICINE

## 2024-10-22 PROCEDURE — 77067 SCR MAMMO BI INCL CAD: CPT | Performed by: INTERNAL MEDICINE

## 2024-11-06 ENCOUNTER — OFFICE VISIT (OUTPATIENT)
Dept: DERMATOLOGY CLINIC | Facility: CLINIC | Age: 69
End: 2024-11-06
Payer: MEDICARE

## 2024-11-06 DIAGNOSIS — L82.1 SEBORRHEIC KERATOSES: ICD-10-CM

## 2024-11-06 DIAGNOSIS — L57.0 AK (ACTINIC KERATOSIS): Primary | ICD-10-CM

## 2024-11-06 DIAGNOSIS — Z08 ENCOUNTER FOR FOLLOW-UP SURVEILLANCE OF SKIN CANCER: ICD-10-CM

## 2024-11-06 DIAGNOSIS — D23.9 BENIGN NEOPLASM OF SKIN, UNSPECIFIED LOCATION: ICD-10-CM

## 2024-11-06 DIAGNOSIS — D22.9 MULTIPLE NEVI: ICD-10-CM

## 2024-11-06 DIAGNOSIS — Z85.828 ENCOUNTER FOR FOLLOW-UP SURVEILLANCE OF SKIN CANCER: ICD-10-CM

## 2024-11-06 PROCEDURE — 99213 OFFICE O/P EST LOW 20 MIN: CPT | Performed by: DERMATOLOGY

## 2024-11-06 PROCEDURE — 17000 DESTRUCT PREMALG LESION: CPT | Performed by: DERMATOLOGY

## 2024-11-06 RX ORDER — FLUOCINONIDE 0.5 MG/G
CREAM TOPICAL
Qty: 60 G | Refills: 3 | Status: SHIPPED | OUTPATIENT
Start: 2024-11-06

## 2024-11-17 NOTE — PROGRESS NOTES
Isela Zavala is a 69 year old female.  HPI:     CC:    Chief Complaint   Patient presents with    Upper Body Exam     LOV 11/2023. Pt present for UBSE. Hx of BCC and Sk's. No spots of concern today.     Eczema     F/u on eczema to b/l feet. Would like refill of Fluocinonide 0.05% cream.         Allergies:  Amoxicillin    HISTORY:    Past Medical History:    Basal cell carcinoma    wide excision, 2010    Essential tremor    drug therapy    Heart valve disease    leaky valve    High blood pressure    Hyperlipidemia    Lipid screening    per NG    Liver lesion    Osteoporosis screening    per NG    Pelvic relaxation    TV, 1999    Skin cancer    BCC x 2 at forehead    Unspecified essential hypertension    drug therapy    Visual impairment      Past Surgical History:   Procedure Laterality Date    Colonoscopy  7/22/2011    per NG    Colonoscopy N/A 4/7/2023    Procedure: COLONOSCOPY;  Surgeon: Alec Goodman MD;  Location: [facility] ENDO    Hysterectomy  1999    TV-enlarged uterus. Ovaries remain      Family History   Problem Relation Age of Onset    Cancer Father         liver (cause of death)    Diabetes Father     Glaucoma Mother 91    Other (Other) Mother     Cancer Other         family h/o liver    Diabetes Other         family h/o     Cancer Self 56        basal cell    Breast Cancer Neg       Social History     Socioeconomic History    Marital status:    Tobacco Use    Smoking status: Never    Smokeless tobacco: Never   Substance and Sexual Activity    Alcohol use: Not Currently     Alcohol/week: 0.0 standard drinks of alcohol     Comment: wine, 1 glass occasionally    Drug use: Not Currently    Sexual activity: Not Currently   Other Topics Concern    Caffeine Concern Yes     Comment: coffee, 2 cups/day    Pt has a pacemaker No    Pt has a defibrillator No    Reaction to local anesthetic No        Current Outpatient Medications   Medication Sig Dispense Refill    fluocinonide 0.05 % External Cream Use  bid for eczema on legs 60 g 3    rosuvastatin 5 MG Oral Tab Take 1 tablet (5 mg total) by mouth nightly. 90 tablet 1    albuterol 108 (90 Base) MCG/ACT Inhalation Aero Soln Inhale 2 puffs into the lungs every 6 (six) hours as needed for Wheezing or Shortness of Breath. 6.7 each 1    albuterol 108 (90 Base) MCG/ACT Inhalation Aero Soln Inhale 2 puffs into the lungs every 6 (six) hours as needed for Wheezing or Shortness of Breath. 1 each 1    meclizine 25 MG Oral Tab Take 1 tablet (25 mg total) by mouth 3 (three) times daily as needed. 30 tablet 2    famotidine 10 MG Oral Tab Take 1 tablet (10 mg total) by mouth 2 (two) times daily.      ergocalciferol 1.25 MG (30137 UT) Oral Cap Take 1 capsule (50,000 Units total) by mouth once a week. 12 capsule 1    lisinopril (PRINIVIL,ZESTRIL) 5 MG Oral Tab Take 1 tablet (5 mg total) by mouth daily.      cholecalciferol 25 MCG (1000 UT) Oral Cap Take 1 capsule (1,000 Units total) by mouth daily.       Allergies:   Allergies   Allergen Reactions    Amoxicillin RASH       Past Medical History:    Basal cell carcinoma    wide excision, 2010    Essential tremor    drug therapy    Heart valve disease    leaky valve    High blood pressure    Hyperlipidemia    Lipid screening    per     Liver lesion    Osteoporosis screening    per     Pelvic relaxation    [facility], 1999    Skin cancer    BCC x 2 at forehead    Unspecified essential hypertension    drug therapy    Visual impairment     Past Surgical History:   Procedure Laterality Date    Colonoscopy  7/22/2011    per     Colonoscopy N/A 4/7/2023    Procedure: COLONOSCOPY;  Surgeon: Alec Goodman MD;  Location: [facility] ENDO    Hysterectomy  1999    TV-enlarged uterus. Ovaries remain     Social History     Socioeconomic History    Marital status:      Spouse name: Not on file    Number of children: Not on file    Years of education: Not on file    Highest education level: Not on file   Occupational History    Not on file    Tobacco Use    Smoking status: Never    Smokeless tobacco: Never   Vaping Use    Vaping status: Not on file   Substance and Sexual Activity    Alcohol use: Not Currently     Alcohol/week: 0.0 standard drinks of alcohol     Comment: wine, 1 glass occasionally    Drug use: Not Currently    Sexual activity: Not Currently   Other Topics Concern     Service Not Asked    Blood Transfusions Not Asked    Caffeine Concern Yes     Comment: coffee, 2 cups/day    Occupational Exposure Not Asked    Hobby Hazards Not Asked    Sleep Concern Not Asked    Stress Concern Not Asked    Weight Concern Not Asked    Special Diet Not Asked    Back Care Not Asked    Exercise Not Asked    Bike Helmet Not Asked    Seat Belt Not Asked    Self-Exams Not Asked    Grew up on a farm Not Asked    History of tanning Not Asked    Outdoor occupation Not Asked    Pt has a pacemaker No    Pt has a defibrillator No    Breast feeding Not Asked    Reaction to local anesthetic No   Social History Narrative    Not on file     Social Drivers of Health     Financial Resource Strain: Not on file   Food Insecurity: Not on file   Transportation Needs: Not on file   Physical Activity: Not on file   Stress: Not on file   Social Connections: Not on file   Housing Stability: Not on file     Family History   Problem Relation Age of Onset    Cancer Father         liver (cause of death)    Diabetes Father     Glaucoma Mother 91    Other (Other) Mother     Cancer Other         family h/o liver    Diabetes Other         family h/o     Cancer Self 56        basal cell    Breast Cancer Neg        There were no vitals filed for this visit.    HPI:    Chief Complaint   Patient presents with    Upper Body Exam     LOV 11/2023. Pt present for UBSE. Hx of BCC and Sk's. No spots of concern today.     Eczema     F/u on eczema to b/l feet. Would like refill of Fluocinonide 0.05% cream.     Past notes/ records and appropriate/relevant lab results including pathology and  past body maps reviewed. Updated and new information noted in current visit.       Follow-up history BCC  No family history skin cancer    History of BCC anterior hairline post excision in the past.  Has been doing well.  Patient presents with concerns above.    Patient has been in their usual state of health.  History, medications, allergies reviewed as noted.      ROS:  Denies any other systemic complaints.  No new or changeing lesions other than noted above. No fevers, chills, night sweats, unusual sun sensitivity.  No other skin complaints.        History, medications, allergies reviewed as noted.       Physical Examination:     Well-developed well-nourished patient alert oriented in no acute distress.  Exam total-body performed, including scalp, head, neck, face,nails, hair, external eyes, including conjunctival mucosa, eyelids, lips external ears, back, chest,/ breasts, axillae,  abdomen, arms, legs, palms.     Multiple light to medium brown, well marginated, uniformly pigmented, macules and papules 6 mm and less scattered on exam. pigmented lesions examined with dermoscopy benign-appearing patterns.     Waxy tannish keratotic papules scattered, cherry-red vascular papules scattered.    See map today's date for lesions noted .      Otherwise remarkable for lesions as noted on map.  See Assessment /Plan for additional history and physical exam also:    Assessment / plan:    No orders of the defined types were placed in this encounter.      Meds & Refills for this Visit:  Requested Prescriptions     Signed Prescriptions Disp Refills    fluocinonide 0.05 % External Cream 60 g 3     Sig: Use bid for eczema on legs         Encounter Diagnoses   Name Primary?    AK (actinic keratosis) Yes    Seborrheic keratoses     Multiple nevi     Benign neoplasm of skin, unspecified location     Encounter for follow-up surveillance of skin cancer        11/23 right medial cheek-Intradermal nevus and adjacent seborrheic  keratosis    Inflamed SKs over the temples,  Numerous benign keratoses over the neck clavicular area upper chest.  Waxy tan keratotic papules lesions in areas of concern as noted reassurance given.  Benign nature discussed.  Possibility of cryo, alphahydroxy acids over-the-counter retinol's discussed.    Erythematous scaling keratotic papules noted at sites noted on map  Actinic Keratoses.  Precancerous nature discussed. Sun protection, sunscreen/ blocks encouraged Lesions treated with cryo- .  Biopsy if not resolved.    Left nasal sidewall  Early AK's more diffusely over the arms.    Eczematous changes over the arms legs  Fluocinonide cream as needed    Eyelid dermatitis triamcinolone 0.025% cream had been working past continue to use this.  Does have eyedrops as well.  Let us know if this is worsening.  Do not use fluocinonide on the face particularly eyelids.  Suspect more atopic process  Dermatitis.  Meds in grid.  Skin care instructions reviewed.  [place] use of emollients.  Pathophysiology reviewed.  Consider Contac allergy in differential.  Consider patch testing.  Patient will let us know how they are doing over the next several weeks.  Await clinical response to above therapies.    Patient with history of skin cancer.  No evidence of recurrence.    No new skin cancer.  Excellent cosmetic outcome at anterior scalp     Biopsy previously benign compound nevus no atypia mid back no recurrence     follow-up 6 months     No other susupicious lesions on todays  exam.    Please refer to map for specific lesions.  See additional diagnoses.  Pros cons of various therapies, risks benefits discussed.Pathophysiology discussed with patient.  Therapeutic options reviewed.  See  Medications in grid.  Instructions reviewed at length.    Benign nevi, seborrheic  keratoses, cherry angiomas:  Reassurance regarding other benign skin lesions.Signs and symptoms of skin cancer, ABCDE's of melanoma discussed with patient. Sunscreen  use, sun protection, self exams reviewed.  Followup as noted RT routine checkup 6 mos - one year or p.r.n.  Encounter Times  Including precharting, reviewing chart, prior notes obtaining history: 5 minutes, medical exam :10 minutes, notes on body map, plan, counseling 10minutes My total time spent caring for the patient on the day of the encounter: 25 minutes   The patient indicates understanding of these issues and agrees to the plan.  The patient is asked to return as noted in follow-up/ above.  This note was generated using Dragon voice recognition software.  Please contact me regarding any confusion resulting from errors in recognition.   Note to patient and family: The 21st Century Cures Act makes medical notes like these available to patients. However, be advised this is a medical document. It is intended as [id number] communication and monitoring of a patient's care needs. It is written in medical language and may contain abbreviations or verbiage that are unfamiliar. It may appear blunt or direct. Medical documents are intended to carry relevant information, facts as evident and the clinical opinion of the practitioner.

## 2025-02-04 ENCOUNTER — HOSPITAL ENCOUNTER (OUTPATIENT)
Age: 70
Discharge: HOME | End: 2025-02-04
Payer: MEDICARE

## 2025-02-04 ENCOUNTER — APPOINTMENT (OUTPATIENT)
Dept: GENERAL RADIOLOGY | Age: 70
End: 2025-02-04
Attending: PHYSICIAN ASSISTANT
Payer: MEDICARE

## 2025-02-04 ENCOUNTER — TELEPHONE (OUTPATIENT)
Dept: ORTHOPEDICS CLINIC | Facility: CLINIC | Age: 70
End: 2025-02-04

## 2025-02-04 VITALS
SYSTOLIC BLOOD PRESSURE: 149 MMHG | TEMPERATURE: 98 F | HEART RATE: 57 BPM | DIASTOLIC BLOOD PRESSURE: 61 MMHG | OXYGEN SATURATION: 99 % | RESPIRATION RATE: 12 BRPM

## 2025-02-04 DIAGNOSIS — S52.502A CLOSED FRACTURE OF DISTAL END OF LEFT RADIUS, UNSPECIFIED FRACTURE MORPHOLOGY, INITIAL ENCOUNTER: Primary | ICD-10-CM

## 2025-02-04 PROCEDURE — 99214 OFFICE O/P EST MOD 30 MIN: CPT

## 2025-02-04 PROCEDURE — 99213 OFFICE O/P EST LOW 20 MIN: CPT

## 2025-02-04 PROCEDURE — 73110 X-RAY EXAM OF WRIST: CPT | Performed by: PHYSICIAN ASSISTANT

## 2025-02-04 PROCEDURE — 29125 APPL SHORT ARM SPLINT STATIC: CPT

## 2025-02-04 NOTE — DISCHARGE INSTRUCTIONS
FOLLOW CAST CARE INSTRUCTION.     CALL ORTHOPEDIC FOR APPOINTMENT.       READDRESS EMERGENTLY FOR BREAKTHROUGH CHANGES/CONCERNS.

## 2025-02-04 NOTE — ED PROVIDER NOTES
Chief Complaint   Patient presents with    Arm or Hand Injury       HPI:     Isela Zavala is a 69 year old female who presents for evaluation mechanical fall yesterday, patient states tripped over uneven pavement falling forward outstretched left hand while taking out trash.  Patient notes pain along the dorsal wrist worse with mobility.  Maximum pain 6 out of 10 took Advil this morning with mild improvement, denies orthopedic injury upper extremity previously, notes small abrasion along the left knee without associated pain from fall.  Denies associated head injury LOC dizziness numbness or tingling      PFSH    PFSH asessment screens reviewed and agree.  Nurses notes reviewed I agree with documentation.    Family History   Problem Relation Age of Onset    Cancer Father         liver (cause of death)    Diabetes Father     Glaucoma Mother 91    Other (Other) Mother     Cancer Other         family h/o liver    Diabetes Other         family h/o     Cancer Self 56        basal cell    Breast Cancer Neg      Family history reviewed with patient/caregiver and is not pertinent to presenting problem.  Social History     Socioeconomic History    Marital status:      Spouse name: Not on file    Number of children: Not on file    Years of education: Not on file    Highest education level: Not on file   Occupational History    Not on file   Tobacco Use    Smoking status: Never    Smokeless tobacco: Never   Vaping Use    Vaping status: Not on file   Substance and Sexual Activity    Alcohol use: Not Currently     Alcohol/week: 0.0 standard drinks of alcohol     Comment: wine, 1 glass occasionally    Drug use: Not Currently    Sexual activity: Not Currently   Other Topics Concern     Service Not Asked    Blood Transfusions Not Asked    Caffeine Concern Yes     Comment: coffee, 2 cups/day    Occupational Exposure Not Asked    Hobby Hazards Not Asked    Sleep Concern Not Asked    Stress Concern Not Asked     Weight Concern Not Asked    Special Diet Not Asked    Back Care Not Asked    Exercise Not Asked    Bike Helmet Not Asked    Seat Belt Not Asked    Self-Exams Not Asked    Grew up on a farm Not Asked    History of tanning Not Asked    Outdoor occupation Not Asked    Pt has a pacemaker No    Pt has a defibrillator No    Breast feeding Not Asked    Reaction to local anesthetic No   Social History Narrative    Not on file     Social Drivers of Health     Food Insecurity: Not on file   Transportation Needs: Not on file   Housing Stability: Not on file         ROS:   Positive for stated complaint: Wrist injury.  All other systems reviewed and negative except as noted above.  Constitutional and Vital Signs Reviewed.      Physical Exam:     Findings:    /61   Pulse 57   Temp 97.9 °F (36.6 °C) (Oral)   Resp 12   SpO2 99%   GENERAL: well developed, well nourished, well hydrated, no distress  SKIN: good skin turgor, no obvious rashes  EXTREMITIES: Mild navicular anatomical snuffbox tenderness extending along the dorsal left wrist.  No crepitus or skin.  No erythema no warmth.  No proximal forearm elbow hand or digit tenderness.  Radial pulse cap refill and distal sensation intact superficial abrasion along the left inferior lateral parapatellar region.  No effusion or direct tenderness along the patella, normal anterior drawer test, normal straight leg raise LOPEZ without difficulty  HEAD: normocephalic, atraumatic  EYES: sclera non icteric bilateral, conjunctiva clear  NEURO: No focal deficits  PSYCH: Alert and oriented x3.  Answering questions appropriately.  Mood appropriate.    MDM/Assessment/Plan:   Orders for this encounter:    Orders Placed This Encounter    XR WRIST NAVICULAR COMPLETE (4 VIEWS), LEFT (CPT=73110)     Order Specific Question:   What is the Relevant Clinical Indication / Reason for Exam?     Answer:   Left wrist pain     Order Specific Question:   Release to patient     Answer:   Immediate     Short arm splint    Sling       Labs performed this visit:  No results found for this or any previous visit (from the past 10 hours).    MDM:  X-ray shows an impacted radius fracture.  Short arm splint was applied, neurovascular intact, sling was provided, instructed on outpatient follow-up with orthopedic over the week ahead instructed on changes warranting emergent reassessment, agreeable to over-the-counter analgesics versus prescription alternatives by discussion.    Diagnosis:    ICD-10-CM    1. Closed fracture of distal end of left radius, unspecified fracture morphology, initial encounter  S52.502A           All results reviewed and discussed with patient.  See AVS for detailed discharge instructions for your condition today.    Follow Up with:  Alonzo Walton MD  [address] [facility] [address] [phone]    Schedule an appointment as soon as possible for a visit in 1 week  As needed    Earl Stout MD  [address]  [place] [address] [phone]    Call today  ORTHOPEDIC REFERRAL

## 2025-02-05 ENCOUNTER — OFFICE VISIT (OUTPATIENT)
Dept: ORTHOPEDICS CLINIC | Facility: CLINIC | Age: 70
End: 2025-02-05
Payer: MEDICARE

## 2025-02-05 VITALS — HEIGHT: 64 IN | BODY MASS INDEX: 31.07 KG/M2 | WEIGHT: 182 LBS

## 2025-02-05 DIAGNOSIS — S52.552A OTHER CLOSED EXTRA-ARTICULAR FRACTURE OF DISTAL END OF LEFT RADIUS, INITIAL ENCOUNTER: Primary | ICD-10-CM

## 2025-02-05 PROCEDURE — 99203 OFFICE O/P NEW LOW 30 MIN: CPT | Performed by: PHYSICIAN ASSISTANT

## 2025-02-05 NOTE — H&P
Clinic Note [facility]     Assessment/Plan:  69 year old female    Left wrist distal radius fracture, nondisplaced-discussed treating this non operatively and transitioning the patient to a removable wrist brace.  I advised the patient of no heavy lifting or weightbearing.  Patient can continue using the fingers for light activities.  She does have some pain ulnarly mostly at the dorsal triquetrum likely ligament injury, there is some possible fracture or ossicle along the ulnar wrist but does appear more chronic.  Will monitor the symptoms ulnarly and I think treating in a wrist brace is still appropriate.  All of her questions were answered.        ICD-10-CM    1. Other closed extra-articular fracture of distal end of left radius, initial encounter  S52.552A DME - EXTERNAL              Follow Up: 2 weeks, x-rays before    Diagnostic Studies:  X-rays left wrist 3 views: These revealed evidence of a distal radius fracture but no significant displacement.      Physical Exam:    Ht 5' 4\" (1.626 m)   Wt 182 lb (82.6 kg)   BMI 31.24 kg/m²     Constitutional: NAD. AOx3. Well-developed and Well-nourished.   Psychiatric: Normal mood/ affect/ behavior. Judgment and thought content normal.     Left upper Extremity:   Inspection: Skin Intact. No skin lesions. No gross deformity.   Palpation:  Tender to palpation over the distal radius at Floridalma's tubercle nontender to the rest of the hand and wrist.  Tender over the dorsal triquetrum but less tender than Floridalma's tubercle.  Nontender over anatomic snuffbox.   Motion: Elbow: normal bilateral symmetric ext/flex  Wrist:  deferred  Finger: full composite fist       CC: Wrist Injury (LT WRIST; FELL; DOI: 2/3//25)        HPI: This 69 year old female presents with complaints of pain to the left wrist.  Patient states that on 2/3/2025 she tripped and fell and landed on her left wrist. Patient had pain and swelling since then.  She went to the emergency room and they told her  to follow-up with orthopedics.  She rates the pain mild.        Past Medical History:    Basal cell carcinoma    wide excision, 2010    Essential tremor    drug therapy    Heart valve disease    leaky valve    High blood pressure    Hyperlipidemia    Lipid screening    per NG    Liver lesion    Osteoporosis screening    per NG    Pelvic relaxation    [facility], 1999    Skin cancer    BCC x 2 at forehead    Unspecified essential hypertension    drug therapy    Visual impairment     Past Surgical History:   Procedure Laterality Date    Colonoscopy  7/22/2011    per NG    Colonoscopy N/A 4/7/2023    Procedure: COLONOSCOPY;  Surgeon: Alec Goodman MD;  Location: [facility] ENDO    Hysterectomy  1999    TV-enlarged uterus. Ovaries remain     Current Outpatient Medications   Medication Sig Dispense Refill    fluocinonide 0.05 % External Cream Use bid for eczema on legs 60 g 3    albuterol 108 (90 Base) MCG/ACT Inhalation Aero Soln Inhale 2 puffs into the lungs every 6 (six) hours as needed for Wheezing or Shortness of Breath. 6.7 each 1    albuterol 108 (90 Base) MCG/ACT Inhalation Aero Soln Inhale 2 puffs into the lungs every 6 (six) hours as needed for Wheezing or Shortness of Breath. 1 each 1    meclizine 25 MG Oral Tab Take 1 tablet (25 mg total) by mouth 3 (three) times daily as needed. 30 tablet 2    famotidine 10 MG Oral Tab Take 1 tablet (10 mg total) by mouth 2 (two) times daily.      ergocalciferol 1.25 MG (94977 UT) Oral Cap Take 1 capsule (50,000 Units total) by mouth once a week. 12 capsule 1    lisinopril (PRINIVIL,ZESTRIL) 5 MG Oral Tab Take 1 tablet (5 mg total) by mouth daily.      cholecalciferol 25 MCG (1000 UT) Oral Cap Take 1 capsule (1,000 Units total) by mouth daily.       Allergies[1]  Family History   Problem Relation Age of Onset    Cancer Father         liver (cause of death)    Diabetes Father     Glaucoma Mother 91    Other (Other) Mother     Cancer Other         family h/o liver    Diabetes Other          family h/o     Cancer Self 56        basal cell    Breast Cancer Neg      Social History     Occupational History    Not on file   Tobacco Use    Smoking status: Never    Smokeless tobacco: Never   Vaping Use    Vaping status: Not on file   Substance and Sexual Activity    Alcohol use: Not Currently     Alcohol/week: 0.0 standard drinks of alcohol     Comment: wine, 1 glass occasionally    Drug use: Not Currently    Sexual activity: Not Currently          Review of Systems (negative unless bolded):  General: fevers, chills, fatigue  CV:  chest pain, palpitations, leg swelling  Msk: bodyaches, neck pain, neck stiffness  Skin: rashes, open wounds, nonhealing ulcers  Hem: bleeds easily, bruise easily, immunocompromised  Neuro: dizziness, light headedness, headaches  Psych: anxious, depressed, anger issues    Bessie Murray PA-C  Hand, Wrist, & Elbow Surgery  Physician Assistant to Dr. Earl [email or web address]  t: [phone]  f: [phone]         [1]   Allergies  Allergen Reactions    Amoxicillin RASH

## 2025-02-17 ENCOUNTER — TELEPHONE (OUTPATIENT)
Dept: ORTHOPEDICS CLINIC | Facility: CLINIC | Age: 70
End: 2025-02-17

## 2025-02-17 DIAGNOSIS — S52.552A OTHER CLOSED EXTRA-ARTICULAR FRACTURE OF DISTAL END OF LEFT RADIUS, INITIAL ENCOUNTER: Primary | ICD-10-CM

## 2025-02-19 ENCOUNTER — HOSPITAL ENCOUNTER (OUTPATIENT)
Dept: GENERAL RADIOLOGY | Age: 70
Discharge: HOME | End: 2025-02-19
Attending: PHYSICIAN ASSISTANT
Payer: MEDICARE

## 2025-02-19 ENCOUNTER — OFFICE VISIT (OUTPATIENT)
Dept: ORTHOPEDICS CLINIC | Facility: CLINIC | Age: 70
End: 2025-02-19
Payer: MEDICARE

## 2025-02-19 VITALS — WEIGHT: 182 LBS | BODY MASS INDEX: 31.07 KG/M2 | HEIGHT: 64 IN

## 2025-02-19 DIAGNOSIS — S52.552A OTHER CLOSED EXTRA-ARTICULAR FRACTURE OF DISTAL END OF LEFT RADIUS, INITIAL ENCOUNTER: ICD-10-CM

## 2025-02-19 DIAGNOSIS — S52.552A OTHER CLOSED EXTRA-ARTICULAR FRACTURE OF DISTAL END OF LEFT RADIUS, INITIAL ENCOUNTER: Primary | ICD-10-CM

## 2025-02-19 PROCEDURE — 73110 X-RAY EXAM OF WRIST: CPT | Performed by: PHYSICIAN ASSISTANT

## 2025-02-19 PROCEDURE — 99213 OFFICE O/P EST LOW 20 MIN: CPT | Performed by: PHYSICIAN ASSISTANT

## 2025-02-19 NOTE — PROGRESS NOTES
Clinic Note [facility]     Assessment/Plan:  69 year old female    Left wrist distal radius fracture, nondisplaced- continue with non op treatment. Patient doing well. Will continue with brace and no heavy lifting. She can come out of the splint to work on gentle motion of the wrist.   Right thumb pain over a1 pulley- this pain started only 2 weeks ago after the fall, could be sprain or possible trigger finger given the area of pain. It might get better on its own, if it doesn't we can try an injection of cortisone at the next visit         Follow Up: 4-5 weeks, x-rays before    Diagnostic Studies:  X-rays left wrist 3 views: These revealed evidence of a distal radius fracture but no significant displacement.      Physical Exam:    Ht 5' 4\" (1.626 m)   Wt 182 lb (82.6 kg)   BMI 31.24 kg/m²     Constitutional: NAD. AOx3. Well-developed and Well-nourished.   Psychiatric: Normal mood/ affect/ behavior. Judgment and thought content normal.     Left upper Extremity:   Inspection: Skin Intact. No skin lesions. No gross deformity.   Palpation:  Tender to palpation over the distal radius at Floridalma's tubercle nontender to the rest of the hand and wrist.      Mildly tender over the right thumb a1    Motion: Elbow: normal bilateral symmetric ext/flex  Wrist:  deferred  Finger: full composite fist       CC: Follow - Up (LT WRIST FX)        HPI: This 69 year old female presents with complaints of pain to the left wrist.  Patient states that on 2/3/2025 she tripped and fell and landed on her left wrist. Patient had pain and swelling since then.  She went to the emergency room and they told her to follow-up with orthopedics.  She rates the pain mild.    Interval hx: patient wearing brace full time, pain improving    Past Medical History:    Basal cell carcinoma    wide excision, 2010    Essential tremor    drug therapy    Heart valve disease    leaky valve    High blood pressure    Hyperlipidemia    Lipid screening    per      Liver lesion    Osteoporosis screening    per     Pelvic relaxation    [facility], 1999    Skin cancer    BCC x 2 at forehead    Unspecified essential hypertension    drug therapy    Visual impairment     Past Surgical History:   Procedure Laterality Date    Colonoscopy  7/22/2011    per     Colonoscopy N/A 4/7/2023    Procedure: COLONOSCOPY;  Surgeon: Alec Goodman MD;  Location: [facility] ENDO    Hysterectomy  1999    TV-enlarged uterus. Ovaries remain     Current Outpatient Medications   Medication Sig Dispense Refill    fluocinonide 0.05 % External Cream Use bid for eczema on legs 60 g 3    albuterol 108 (90 Base) MCG/ACT Inhalation Aero Soln Inhale 2 puffs into the lungs every 6 (six) hours as needed for Wheezing or Shortness of Breath. 6.7 each 1    albuterol 108 (90 Base) MCG/ACT Inhalation Aero Soln Inhale 2 puffs into the lungs every 6 (six) hours as needed for Wheezing or Shortness of Breath. 1 each 1    meclizine 25 MG Oral Tab Take 1 tablet (25 mg total) by mouth 3 (three) times daily as needed. 30 tablet 2    famotidine 10 MG Oral Tab Take 1 tablet (10 mg total) by mouth 2 (two) times daily.      ergocalciferol 1.25 MG (93020 UT) Oral Cap Take 1 capsule (50,000 Units total) by mouth once a week. 12 capsule 1    lisinopril (PRINIVIL,ZESTRIL) 5 MG Oral Tab Take 1 tablet (5 mg total) by mouth daily.      cholecalciferol 25 MCG (1000 UT) Oral Cap Take 1 capsule (1,000 Units total) by mouth daily.       Allergies[1]  Family History   Problem Relation Age of Onset    Cancer Father         liver (cause of death)    Diabetes Father     Glaucoma Mother 91    Other (Other) Mother     Cancer Other         family h/o liver    Diabetes Other         family h/o     Cancer Self 56        basal cell    Breast Cancer Neg      Social History     Occupational History    Not on file   Tobacco Use    Smoking status: Never    Smokeless tobacco: Never   Vaping Use    Vaping status: Not on file   Substance and Sexual  Activity    Alcohol use: Not Currently     Alcohol/week: 0.0 standard drinks of alcohol     Comment: wine, 1 glass occasionally    Drug use: Not Currently    Sexual activity: Not Currently          Review of Systems (negative unless bolded):  General: fevers, chills, fatigue  CV:  chest pain, palpitations, leg swelling  Msk: bodyaches, neck pain, neck stiffness  Skin: rashes, open wounds, nonhealing ulcers  Hem: bleeds easily, bruise easily, immunocompromised  Neuro: dizziness, light headedness, headaches  Psych: anxious, depressed, anger issues    Bessie Murray PA-C  Hand, Wrist, & Elbow Surgery  Physician Assistant to Dr. Earl [email or web address]  t: [phone]  f: [phone]         [1]   Allergies  Allergen Reactions    Amoxicillin RASH

## 2025-03-12 RX ORDER — ROSUVASTATIN CALCIUM 5 MG/1
5 TABLET, COATED ORAL NIGHTLY
Qty: 90 TABLET | Refills: 1 | Status: SHIPPED | OUTPATIENT
Start: 2025-03-12

## 2025-03-13 ENCOUNTER — TELEPHONE (OUTPATIENT)
Dept: ORTHOPEDICS CLINIC | Facility: CLINIC | Age: 70
End: 2025-03-13

## 2025-03-13 DIAGNOSIS — S52.552A OTHER CLOSED EXTRA-ARTICULAR FRACTURE OF DISTAL END OF LEFT RADIUS, INITIAL ENCOUNTER: Primary | ICD-10-CM

## 2025-03-19 ENCOUNTER — TELEPHONE (OUTPATIENT)
Dept: PHYSICAL THERAPY | Facility: HOSPITAL | Age: 70
End: 2025-03-19

## 2025-03-19 ENCOUNTER — OFFICE VISIT (OUTPATIENT)
Dept: ORTHOPEDICS CLINIC | Facility: CLINIC | Age: 70
End: 2025-03-19
Payer: MEDICARE

## 2025-03-19 ENCOUNTER — HOSPITAL ENCOUNTER (OUTPATIENT)
Dept: GENERAL RADIOLOGY | Age: 70
Discharge: HOME | End: 2025-03-19
Attending: ORTHOPAEDIC SURGERY
Payer: MEDICARE

## 2025-03-19 VITALS — BODY MASS INDEX: 31.07 KG/M2 | HEIGHT: 64 IN | WEIGHT: 182 LBS

## 2025-03-19 DIAGNOSIS — S52.552A OTHER CLOSED EXTRA-ARTICULAR FRACTURE OF DISTAL END OF LEFT RADIUS, INITIAL ENCOUNTER: ICD-10-CM

## 2025-03-19 DIAGNOSIS — S52.552A OTHER CLOSED EXTRA-ARTICULAR FRACTURE OF DISTAL END OF LEFT RADIUS, INITIAL ENCOUNTER: Primary | ICD-10-CM

## 2025-03-19 PROCEDURE — 73110 X-RAY EXAM OF WRIST: CPT | Performed by: ORTHOPAEDIC SURGERY

## 2025-03-19 PROCEDURE — 99213 OFFICE O/P EST LOW 20 MIN: CPT | Performed by: ORTHOPAEDIC SURGERY

## 2025-03-19 NOTE — PROGRESS NOTES
Clinic Note       Assessment/Plan:  69 year old female    Left wrist distal radius fracture, nondisplaced-radiographic healing noted.  Discontinue wrist brace.  Can do 1-2 sessions of therapy to learn home exercise program or can do it on her own.  Continue having some ulnar-sided wrist pain which should get better with time if at 3 months still bothering her significantly steroid injection can be performed.  She does have an elongated ulnar styloid possibly secondary to an injury when she was younger.  Right thumb pain over a1 pulley- this pain started only 2 weeks ago after the fall, could be sprain or possible trigger finger given the area of pain. It might get better on its own, if it doesn't we can try an injection of cortisone at the next visit.  No pain on exam today      Follow Up: 6 weeks, clinical check, okay to cancel if doing well    Diagnostic Studies:  X-rays left wrist 3 views: Extra-articular distal radius fracture without interval displacement and interval signs of healing.    Physical Exam:    Ht 5' 4\" (1.626 m)   Wt 182 lb (82.6 kg)   BMI 31.24 kg/m²     Constitutional: NAD. AOx3. Well-developed and Well-nourished.   Psychiatric: Normal mood/ affect/ behavior. Judgment and thought content normal.     Left upper Extremity:   Inspection: Skin Intact. No skin lesions. No gross deformity.   Palpation: Mild focal tenderness at the ulnar fovea     Motion: Elbow: normal bilateral symmetric ext/flex  Wrist: 5 degrees short of full wrist extension/flexion.  Full pronation supination  Finger: full composite fist       CC: Follow - Up (Left wrist fx f/u )        HPI: This 69 year old female presents with complaints of pain to the left wrist.  Patient states that on 2/3/2025 she tripped and fell and landed on her left wrist. Patient had pain and swelling since then.  She went to the emergency room and they told her to follow-up with orthopedics.  She rates the pain mild.    Interval Hx (3/19/2025):  Patient reports improvement in pain over the distal radius but continues to have mild pain along the ulnar aspect of the wrist      Past Medical History:    Basal cell carcinoma    wide excision, 2010    Essential tremor    drug therapy    Heart valve disease    leaky valve    High blood pressure    Hyperlipidemia    Lipid screening    per NG    Liver lesion    Osteoporosis screening    per NG    Pelvic relaxation    [facility], 1999    Skin cancer    BCC x 2 at forehead    Unspecified essential hypertension    drug therapy    Visual impairment     Past Surgical History:   Procedure Laterality Date    Colonoscopy  7/22/2011    per NG    Colonoscopy N/A 4/7/2023    Procedure: COLONOSCOPY;  Surgeon: Alec Goodman MD;  Location: [facility] ENDO    Hysterectomy  1999    TV-enlarged uterus. Ovaries remain     Current Outpatient Medications   Medication Sig Dispense Refill    ROSUVASTATIN 5 MG Oral Tab TAKE 1 TABLET BY MOUTH EVERY DAY AT NIGHT 90 tablet 1    fluocinonide 0.05 % External Cream Use bid for eczema on legs 60 g 3    albuterol 108 (90 Base) MCG/ACT Inhalation Aero Soln Inhale 2 puffs into the lungs every 6 (six) hours as needed for Wheezing or Shortness of Breath. 6.7 each 1    albuterol 108 (90 Base) MCG/ACT Inhalation Aero Soln Inhale 2 puffs into the lungs every 6 (six) hours as needed for Wheezing or Shortness of Breath. 1 each 1    meclizine 25 MG Oral Tab Take 1 tablet (25 mg total) by mouth 3 (three) times daily as needed. 30 tablet 2    famotidine 10 MG Oral Tab Take 1 tablet (10 mg total) by mouth 2 (two) times daily.      ergocalciferol 1.25 MG (02706 UT) Oral Cap Take 1 capsule (50,000 Units total) by mouth once a week. 12 capsule 1    lisinopril (PRINIVIL,ZESTRIL) 5 MG Oral Tab Take 1 tablet (5 mg total) by mouth daily.      cholecalciferol 25 MCG (1000 UT) Oral Cap Take 1 capsule (1,000 Units total) by mouth daily.       Allergies[1]  Family History   Problem Relation Age of Onset    Cancer Father          liver (cause of death)    Diabetes Father     Glaucoma Mother 91    Other (Other) Mother     Cancer Other         family h/o liver    Diabetes Other         family h/o     Cancer Self 56        basal cell    Breast Cancer Neg      Social History     Occupational History    Not on file   Tobacco Use    Smoking status: Never    Smokeless tobacco: Never   Vaping Use    Vaping status: Not on file   Substance and Sexual Activity    Alcohol use: Not Currently     Alcohol/week: 0.0 standard drinks of alcohol     Comment: wine, 1 glass occasionally    Drug use: Not Currently    Sexual activity: Not Currently          Review of Systems (negative unless bolded):  General: fevers, chills, fatigue  CV:  chest pain, palpitations, leg swelling  Msk: bodyaches, neck pain, neck stiffness  Skin: rashes, open wounds, nonhealing ulcers  Hem: bleeds easily, bruise easily, immunocompromised  Neuro: dizziness, light headedness, headaches  Psych: anxious, depressed, anger issues    Bessie Murray PA-C  Hand, Wrist, & Elbow Surgery  Physician Assistant to Dr. Earl [email or web address]  t: [phone]  f: [phone]         [1]   Allergies  Allergen Reactions    Amoxicillin RASH

## 2025-03-21 ENCOUNTER — TELEPHONE (OUTPATIENT)
Facility: CLINIC | Age: 70
End: 2025-03-21

## 2025-03-21 DIAGNOSIS — S52.552A OTHER CLOSED EXTRA-ARTICULAR FRACTURE OF DISTAL END OF LEFT RADIUS, INITIAL ENCOUNTER: Primary | ICD-10-CM

## 2025-03-21 NOTE — TELEPHONE ENCOUNTER
Request: OT/PT (OT order pending)    Patient wants a few sessions to see if she can do a HEP but wants to be able to do more if she prefers in person.

## 2025-03-21 NOTE — TELEPHONE ENCOUNTER
Patient called to see if  can put in her PT order. They still have not received anything yet.    She will call on Monday for an update.    Please advise.

## 2025-03-26 ENCOUNTER — OFFICE VISIT (OUTPATIENT)
Dept: PHYSICAL THERAPY | Age: 70
End: 2025-03-26
Payer: MEDICARE

## 2025-03-26 ENCOUNTER — TELEPHONE (OUTPATIENT)
Dept: PHYSICAL THERAPY | Facility: HOSPITAL | Age: 70
End: 2025-03-26

## 2025-03-26 DIAGNOSIS — S52.552A OTHER CLOSED EXTRA-ARTICULAR FRACTURE OF DISTAL END OF LEFT RADIUS, INITIAL ENCOUNTER: Primary | ICD-10-CM

## 2025-03-26 PROCEDURE — 97110 THERAPEUTIC EXERCISES: CPT

## 2025-03-26 PROCEDURE — 97165 OT EVAL LOW COMPLEX 30 MIN: CPT

## 2025-03-26 NOTE — PROGRESS NOTES
OT UE EVALUATION:     Diagnosis:   Left wrist distal radius fracture Patient:  Isela Zavala (69 year old, female)        Referring Provider: Earl Stout  Today's Date   3/26/2025    Precautions:  None   Date of Evaluation: 03/26/25  Next MD visit: 4/30/2025  Date of Injury: 2/3/2025  Date of Surgery: No data recorded     PATIENT SUMMARY   Summary of chief complaints: Decreased function  History of current condition: Patient states that on 2/3/2025 she tripped and fell and landed on her left wrist. Patient had pain and swelling since then.  She went to the emergency room and they told her to follow-up with orthopedics.   Pain level: current 1 /10, at best 1 /10, at worst 3 /10  Description of symptoms: Dull ache   Occupation: Retired   Occupational Roles:     Leisure activities/Hobbies: Jigsaw puzzles, gardening   Prior level of function: Independent  Current limitations: Weight bearing, lifting/carrying, opening jars/bottles, gripping  Pt goals: return to PLOF  Hand Dominance: right  Living Situation: w/ spouse    Past medical history was reviewed with Isela.  Significant findings include: Basal cell carcinoma     wide excision, 2010    Essential tremor     drug therapy    Heart valve disease     leaky valve    High blood pressure    Hyperlipidemia    Lipid screening     per NG    Liver lesion    Osteoporosis screening     per NG    Pelvic relaxation     [facility], 1999    Skin cancer     BCC x 2 at forehead    Unspecified essential hypertension     drug therapy    Visual impairment  Imaging/Tests: Naye Weiss  has a past medical history of Basal cell carcinoma, Essential tremor, Heart valve disease, High blood pressure, Hyperlipidemia (12/19/2020), Lipid screening (11/23/2013), Liver lesion (10/28/2023), Osteoporosis screening (03/30/2011), Pelvic relaxation, Skin cancer, Unspecified essential hypertension, and Visual impairment.  She  has a past surgical history that includes colonoscopy (7/22/2011);  hysterectomy (1999); and colonoscopy (N/A, 4/7/2023).    ASSESSMENT  Isela presents to occupational therapy evaluation with primary c/o Decreased function. The results of the objective tests and measures show  . Functional deficits include but are not limited to Weight bearing, lifting/carrying, opening jars/bottles, gripping. Signs and symptoms are consistent with diagnosis of Left wrist distal radius fracture. Pt and OT discussed evaluation findings, pathology, POC and HEP.  Pt voiced understanding and performs HEP correctly without reported pain. Skilled Occupational Therapy is medically necessary to address the above impairments and reach functional goals.  OBJECTIVE:    Musculoskeletal  Observation: Unremarkable Unremarkable       Orthotics: None     Cervical Screen: N/A  Special Tests: N/A     ROM and Strength  (* denotes performed with pain)  Wrist   ROM    R L     Flex (C7) 50 30     Ext (C6) 59 46     Ulnar Dev 55 20     Radial Dev 29 20       Flexibility:         Edema:  Circum Edema (cm) Wrist Crease     Right 17.3 cm      Left 18 cm        Neurological:  Sensory: WNL       Light Touch Ten Test: WNL    ADLs/IADLs:  ADL's    Bathing: Independent     Dressing: Modified independence     Feeding: Independent     Grooming: Independent  IADL's     Homemaking: Modified independence     Food Prep: Modified independence     Driving: Independent   Other Functional Mobility/ADL Comments: Independent      Today's Treatment and Response:   Pt education was provided on exam findings, treatment diagnosis, treatment plan, expectations, and prognosis.  Today's Treatment       3/26/2025   OT Treatment   Therapeutic Exercise AROM with fluidotherapy x 10\"  Wrist stretches x 5   Therapeutic Exercise Min 15   Eval Min 30   Total Timed Procedures 15   Total Service Procedures 45   Total Time 45         Patient was instructed in and issued a HEP for: Wrist PROM, stretches  [facility] stabilization     Charges:  OT EVAL Low  Complexity, 1 OT eval, 1 TE   Based on analysis of data from a problem-focused assessment from a brief chart review, clinical presentation of physical, cognitive and psychosocial skills, as well as review of patient rated outcome measures, this evaluation involved Low complexity decision making, with 1-3 occupational performance component deficits, no comorbidities, and no need for modification of tasks or assistance with assessment.                                                                                    PLAN OF CARE:    Goals: (to be met in 8 visits)   Increase (L) AROM wrist flexion by 5-10 degrees to allow pt to perform housekeeping.  Increase (L) AROM wrist extension by 5-10 degrees to allow pt to carry items in hand.    Increase (L) AROM wrist UD  by 5-10 degrees to allow pt to open jars.    Increase (L) AROM wrist RD by 5-10 degrees to allow pt to  tighten bottles.   Assess  and pinch when able.      Frequency / Duration: Patient will be seen 1-2 x week for 4-6 weeksx/week or a total of 8 visits over a 90 day period. Treatment will include: Manual Therapy; Neuromuscular Re-education; Therapeutic Exercise; Therapeutic Activities; Self-Care Home Management; Home Exercise Program instruction; Ultrasound    Education or treatment limitation: None   Rehab Potential: good       Patient/Family/Caregiver was advised of these findings, precautions, and treatment options and has agreed to actively participate in planning and for this course of care.    Thank you for your referral. Please co-sign or sign and return this letter via fax as soon as possible to [phone]. If you have any questions, please contact me at Dept: [phone]    Sincerely,  Electronically signed by therapist: Amanda Collins OT  Physician's certification required: Yes  I certify the need for these services furnished under this plan of treatment and while under my care.    X___________________________________________________  Date____________________    Certification From: 3/26/2025  To:6/24/2025

## 2025-04-03 ENCOUNTER — OFFICE VISIT (OUTPATIENT)
Dept: PHYSICAL THERAPY | Age: 70
End: 2025-04-03
Attending: ORTHOPAEDIC SURGERY
Payer: MEDICARE

## 2025-04-03 PROCEDURE — 97110 THERAPEUTIC EXERCISES: CPT

## 2025-04-03 PROCEDURE — 97140 MANUAL THERAPY 1/> REGIONS: CPT

## 2025-04-03 NOTE — PROGRESS NOTES
Patient: Isela Zavala (69 year old, female) Referring Provider:  Insurance:   [facility]: [facility]   Date of Surgery: No data recorded Next MD visit:  COMMERCIAL   Precautions:  None 4/30/2025 Referral Information:   Date of Injury: 2/3/2025 Date of Evaluation: Req: 0, Auth: 0, Exp:     03/26/25 POC Auth Visits:          Today's Date   4/3/2025    Subjective  Pt states that the exercises helped with the flexiblity.       Pain: 1/10     Objective  See tx log for details.         Assessment  Reviewed HEP with pt. Pt performing as instructed.  Pt having sujective c/o pain at ulnar side of wrist. Pain most likely due to instablity in ligaments. Applied kinesiotape with space correction at 50% to provide stablity to ulnar side of wrist. Pt reported decreased pain at ulnar side or wrist after kinesiotape. Pt advised to continue with use hand for functional actvties to allow for return to PLOF.    Goals (to be met in 8 visits)   Increase (L) AROM wrist flexion by 5-10 degrees to allow pt to perform housekeeping.  Increase (L) AROM wrist extension by 5-10 degrees to allow pt to carry items in hand.    Increase (L) AROM wrist UD  by 5-10 degrees to allow pt to open jars.    Increase (L) AROM wrist RD by 5-10 degrees to allow pt to  tighten bottles.   Assess  and pinch when able.          Plan  Patient will be seen 1-2 x week for 4-6 weeksx/week or a total of 8 visits over a 90 day    Treatment Last 4 Visits        3/26/2025 4/3/2025   OT Treatment   Treatment Day  2   Therapeutic Exercise AROM with fluidotherapy x 10\"  Wrist stretches x 5 AROM with fluidotherapy x 10\"  Powerweb x 3 min stretches  Bilateral ball movements (weighted) wrist (all planes) x 2 min each  Hand helper 2 Y RB, 1 RB x 3 min   Manual Therapy  STM  Edema massage   Therapeutic Exercise Min 15 35   Manual Therapy Min  10   Eval Min 30    Total Timed Procedures 15 45   Total Service Procedures 45 45    Total Time 45 45              Charges     1 MT, 2 TE    Amanda Collins,OTR/L

## 2025-04-08 ENCOUNTER — OFFICE VISIT (OUTPATIENT)
Dept: PHYSICAL THERAPY | Age: 70
End: 2025-04-08
Attending: ORTHOPAEDIC SURGERY
Payer: MEDICARE

## 2025-04-08 PROCEDURE — 97140 MANUAL THERAPY 1/> REGIONS: CPT

## 2025-04-08 PROCEDURE — 97110 THERAPEUTIC EXERCISES: CPT

## 2025-04-08 NOTE — PROGRESS NOTES
Patient: Isela Zavala (69 year old, female) Referring Provider:  Insurance:   Diagnosis: Left wrist distal radius fracture Earl Stout  MEDICARE   Date of Surgery: No data recorded Next MD visit:  COMMERCIAL   Precautions:  None 4/30/2025 Referral Information:   Date of Injury: 2/3/2025 Date of Evaluation: Req: 0, Auth: 0, Exp:     03/26/25 POC Auth Visits:          Today's Date   4/8/2025    Subjective  Pt states that the tape really helped with the pain at the side of the wrist.       Pain: 0/10     Objective  See tx log for details.           Assessment  Decreased pain reported at ulnar side of wrist with kinesiotape. Pt able to perform more functional actvties with kinesiotape as pain levels were minimal.  Provided instructions on how to apply kinesiotape to pt. Pt able to demonstrate good understanding of application. Good tolerance to session without any increase in symptoms.  Thompson initiate strengthening once pain levels subside.    Goals (to be met in 8 visits)   Increase (L) AROM wrist flexion by 5-10 degrees to allow pt to perform housekeeping.  Increase (L) AROM wrist extension by 5-10 degrees to allow pt to carry items in hand.    Increase (L) AROM wrist UD  by 5-10 degrees to allow pt to open jars.    Increase (L) AROM wrist RD by 5-10 degrees to allow pt to  tighten bottles.   Assess  and pinch when able.              Plan  Patient will be seen 1-2 x week for 4-6 weeksx/week or a total of 8 visits over a 90 day    Treatment Last 4 Visits        3/26/2025 4/3/2025 4/8/2025   OT Treatment   Treatment Day  2 3   Therapeutic Exercise AROM with fluidotherapy x 10\"  Wrist stretches x 5 AROM with fluidotherapy x 10\"  Powerweb x 3 min stretches  Bilateral ball movements (weighted) wrist (all planes) x 2 min each  Hand helper 2 Y RB, 1 RB x 3 min AROM with fluidotherapy x 10\"  Powerweb x 3 min stretches  Bilateral ball movements (weighted) wrist (all planes) x 2 min each  Hand helper 2 Y RB, 1 RB x 3  min  Pt education: Kinesiotape application   Neuro Re-Educ   Kinesiotape: space correction with 50% stretch over ulnar side of wrist   Manual Therapy  STM  Edema massage STM  Edema massage     Neuro Re-Ed Min   3   Therapeutic Exercise Min 15 35 33   Manual Therapy Min  10 10   Eval Min 30     Total Timed Procedures 15 45 46   Total Service Procedures 45 45 46   Total Time 45 45 46            Charges     1 MT, 2 YOLIS Collins,OTR/L

## 2025-04-10 ENCOUNTER — OFFICE VISIT (OUTPATIENT)
Dept: PHYSICAL THERAPY | Age: 70
End: 2025-04-10
Attending: ORTHOPAEDIC SURGERY
Payer: MEDICARE

## 2025-04-10 PROCEDURE — 97112 NEUROMUSCULAR REEDUCATION: CPT

## 2025-04-10 PROCEDURE — 97110 THERAPEUTIC EXERCISES: CPT

## 2025-04-10 NOTE — PROGRESS NOTES
Patient: Isela Zavala (69 year old, female) Referring Provider:  Insurance:   Diagnosis: Left wrist distal radius fracture Earl Stout  MEDICARE   Date of Surgery: No data recorded Next MD visit:  COMMERCIAL   Precautions:  None 4/30/2025 Referral Information:   Date of Injury: 2/3/2025 Date of Evaluation: Req: 0, Auth: 0, Exp:     03/26/25 POC Auth Visits:          Today's Date   4/10/2025    Subjective  Pt states that she has mild pain at the top of the wrist.       Pain: 0/10     Objective  See tx log for details.             Assessment  Pt reporting having mild stiffness at dorsal wrist. Noted tightness along DRUJ with palpation. Improved ROM after manual therapy. Pt purchased kinesiotape to apply as needed for discomfort along ulnar side of wrist. Mild instbality continues to present in wrist. Pt was able to perform stabliziation exercises in clinic with mild difficulty.    Goals (to be met in 8 visits)   Increase (L) AROM wrist flexion by 5-10 degrees to allow pt to perform housekeeping.  Increase (L) AROM wrist extension by 5-10 degrees to allow pt to carry items in hand.    Increase (L) AROM wrist UD  by 5-10 degrees to allow pt to open jars.    Increase (L) AROM wrist RD by 5-10 degrees to allow pt to  tighten bottles.   Assess  and pinch when able.                  Plan  Patient will be seen 1-2 x week for 4-6 weeksx/week or a total of 8 visits over a 90 day    Treatment Last 4 Visits        3/26/2025 4/3/2025 4/8/2025 4/10/2025   OT Treatment   Treatment Day  2 3 4   Therapeutic Exercise AROM with fluidotherapy x 10\"  Wrist stretches x 5 AROM with fluidotherapy x 10\"  Powerweb x 3 min stretches  Bilateral ball movements (weighted) wrist (all planes) x 2 min each  Hand helper 2 Y RB, 1 RB x 3 min AROM with fluidotherapy x 10\"  Powerweb x 3 min stretches  Bilateral ball movements (weighted) wrist (all planes) x 2 min each  Hand helper 2 Y RB, 1 RB x 3 min  Pt education: Kinesiotape application AROM  with fluidotherapy x 10\"  Powerweb x 3 min stretches  Hand helper 2 Y RB, 1 RB x 3 min  Flexbar perturbations (in neutral and in pronation x 2 min each)   Neuro Re-Educ   Kinesiotape: space correction with 50% stretch over ulnar side of wrist Wobble board x 3 min  [place] perturbations with 7 marbles     Manual Therapy  STM  Edema massage STM  Edema massage      Neuro Re-Ed Min   3 15   Therapeutic Exercise Min 15 35 33 30   Manual Therapy Min  10 10    Eval Min 30      Total Timed Procedures 15 45 46 45   Total Service Procedures 45 45 46 45   Total Time 45 45 46 45           Charges     1 NM, 2 TE    Amanda Collins,OTR/L

## 2025-04-21 ENCOUNTER — OFFICE VISIT (OUTPATIENT)
Dept: PHYSICAL THERAPY | Age: 70
End: 2025-04-21
Attending: ORTHOPAEDIC SURGERY
Payer: MEDICARE

## 2025-04-21 PROCEDURE — 97110 THERAPEUTIC EXERCISES: CPT

## 2025-04-21 PROCEDURE — 97140 MANUAL THERAPY 1/> REGIONS: CPT

## 2025-04-21 NOTE — PROGRESS NOTES
Patient: Isela Zavala (69 year old, female) Referring Provider:  Insurance:   Diagnosis: Left wrist distal radius fracture Earl Stout  MEDICARE   Date of Surgery: No data recorded Next MD visit:  COMMERCIAL   Precautions:  None 4/30/2025 Referral Information:   Date of Injury: 2/3/2025 Date of Evaluation: Req: 0, Auth: 0, Exp:     03/26/25 POC Auth Visits:          Today's Date   4/21/2025    Subjective  Pt states that she was able to do some gardening and it went well.       Pain: 0/10     Objective  See tx log for details.           Assessment  Decreasxed oain along ulnar side of wrist. Pt able to perform gardening with only mild discomfort. Pt having some tighness in wrist after actvtity. Pt did not require applying kinesiotpae for pain as it had reduced. Initiated gentle strenghtening which pt was able to toelate well without any increase in symptoms.    Goals (to be met in 8 visits)   Increase (L) AROM wrist flexion by 5-10 degrees to allow pt to perform housekeeping.  Increase (L) AROM wrist extension by 5-10 degrees to allow pt to carry items in hand.    Increase (L) AROM wrist UD  by 5-10 degrees to allow pt to open jars.    Increase (L) AROM wrist RD by 5-10 degrees to allow pt to  tighten bottles.   Assess  and pinch when able.                      Plan  Patient will be seen 1-2 x week for 4-6 weeksx/week or a total of 8 visits over a 90 day    Treatment Last 4 Visits        4/3/2025 4/8/2025 4/10/2025 4/21/2025   OT Treatment   Treatment Day 2 3 4 5   Therapeutic Exercise AROM with fluidotherapy x 10\"  Powerweb x 3 min stretches  Bilateral ball movements (weighted) wrist (all planes) x 2 min each  Hand helper 2 Y RB, 1 RB x 3 min AROM with fluidotherapy x 10\"  Powerweb x 3 min stretches  Bilateral ball movements (weighted) wrist (all planes) x 2 min each  Hand helper 2 Y RB, 1 RB x 3 min  Pt education: Kinesiotape application AROM with fluidotherapy x 10\"  Powerweb x 3 min stretches  Hand helper  2 Y RB, 1 RB x 3 min  Flexbar perturbations (in neutral and in pronation x 2 min each) AROM with fluidotherapy x 10\"  Powerweb x 3 min stretches  Sponges with gripper at first rung  Flexbar strengthening wrist/flex, sup/pro x 2 min   Neuro Re-Educ  Kinesiotape: space correction with 50% stretch over ulnar side of wrist Wobble board x 3 min  [place]risbee perturbations with 7 marbles      Manual Therapy STM  Edema massage STM  Edema massage    STM  Edema massage     Neuro Re-Ed Min  3 15    Therapeutic Exercise Min 35 33 30 35   Manual Therapy Min 10 10  10   Total Timed Procedures 45 46 45 45   Total Service Procedures 45 46 45 45   Total Time 45 46 45 45              Charges     1 MT, 2 YOLIS Collins,OTR/L

## 2025-04-23 ENCOUNTER — OFFICE VISIT (OUTPATIENT)
Dept: PHYSICAL THERAPY | Age: 70
End: 2025-04-23
Attending: ORTHOPAEDIC SURGERY
Payer: MEDICARE

## 2025-04-23 PROCEDURE — 97140 MANUAL THERAPY 1/> REGIONS: CPT

## 2025-04-23 PROCEDURE — 97110 THERAPEUTIC EXERCISES: CPT

## 2025-04-23 NOTE — PROGRESS NOTES
Patient: Isela Zavala (69 year old, female) Referring Provider:  Insurance:   Diagnosis: Left wrist distal radius fracture Earl Stout  MEDICARE   Date of Surgery: No data recorded Next MD visit:  COMMERCIAL   Precautions:  None 4/30/2025 Referral Information:   Date of Injury: 2/3/2025 Date of Evaluation: Req: 0, Auth: 0, Exp:     03/26/25 POC Auth Visits:          Today's Date   4/23/2025    Subjective  Pt states that her wrist is moving so much better.       Pain: 0/10     Objective  See tx log for details.           Assessment  Pt having mid pain along ECU with certain moevements. Applied kinesiotape along ECU with 50 % stretch for space correction. Improved stabilty reported after application. Pt performing light functional actvties at this time as she has improved ROM and flexiblity in wrist.    Goals (to be met in 8 visits)   Increase (L) AROM wrist flexion by 5-10 degrees to allow pt to perform housekeeping.  Increase (L) AROM wrist extension by 5-10 degrees to allow pt to carry items in hand.    Increase (L) AROM wrist UD  by 5-10 degrees to allow pt to open jars.    Increase (L) AROM wrist RD by 5-10 degrees to allow pt to  tighten bottles.   Assess  and pinch when able.                          Plan  Patient will be seen 1-2 x week for 4-6 weeksx/week or a total of 8 visits over a 90 day    Treatment Last 4 Visits        4/8/2025 4/10/2025 4/21/2025 4/23/2025   OT Treatment   Treatment Day 3 4 5    Therapeutic Exercise AROM with fluidotherapy x 10\"  Powerweb x 3 min stretches  Bilateral ball movements (weighted) wrist (all planes) x 2 min each  Hand helper 2 Y RB, 1 RB x 3 min  Pt education: Kinesiotape application AROM with fluidotherapy x 10\"  Powerweb x 3 min stretches  Hand helper 2 Y RB, 1 RB x 3 min  Flexbar perturbations (in neutral and in pronation x 2 min each) AROM with fluidotherapy x 10\"  Powerweb x 3 min stretches  Sponges with gripper at first rung  Flexbar strengthening wrist/flex,  sup/pro x 2 min AROM with fluidotherapy x 10\"  Wrist exerciser x 3 min  Flexbar strengthening wrist flex/ext, sup/pro x 2 min each     Neuro Re-Educ Kinesiotape: space correction with 50% stretch over ulnar side of wrist Wobble board x 3 min  [place]risbee perturbations with 7 marbles    Kinesiotape along ECU with 50% stretch    Manual Therapy STM  Edema massage    STM  Edema massage   STM  Edema massage  Cross frictional massage   Neuro Re-Ed Min 3 15  5   Therapeutic Exercise Min 33 30 35 30   Manual Therapy Min 10  10 10   Total Timed Procedures 46 45 45 45   Total Service Procedures 46 45 45 45   Total Time 46 45 45 45           Charges     1 MT, 2 YOLIS Collins,OTR/L

## 2025-04-28 ENCOUNTER — OFFICE VISIT (OUTPATIENT)
Dept: PHYSICAL THERAPY | Age: 70
End: 2025-04-28
Attending: ORTHOPAEDIC SURGERY
Payer: MEDICARE

## 2025-04-28 PROCEDURE — 97110 THERAPEUTIC EXERCISES: CPT

## 2025-04-28 PROCEDURE — 97140 MANUAL THERAPY 1/> REGIONS: CPT

## 2025-04-29 NOTE — PROGRESS NOTES
Progress Note  Patient: Isela Zavala (69 year old, female) Referring Provider:  Insurance:   Diagnosis: Left wrist distal radius fracture Earl Stout  MEDICARE   Date of Surgery: No data recorded Next MD visit:  COMMERCIAL   Precautions:  None 4/30/2025 Referral Information:   Date of Injury: 2/3/2025 Date of Evaluation: Req: 0, Auth: 0, Exp:     03/26/25 POC Auth Visits:          Today's Date   4/28/2025    Subjective  Pt states her she had a bit of pain yesterday and needed to take medicine.       Pain: 1/10     Objective  See tx log for details.   Wrist       3/26/2025 4/28/2025   Wrist ROM/MMT   Rt Wrist Flex (C7) 50    Lt Wrist Flex (C7) 30 51   Rt Wrist ext (C6) 59    Lt Wrist ext (C6) 46 50   Rt Wrist Ulnar Deviation 55    Lt Wrist Ulnar Deviation 20 20   Rt Wrist Radial Deviation 29    Lt Wrist Radial Deviation 20 25        Assessment  Pt seen for 6/8 visits since initial evaluation on 03/26/25. Treatment consisted of therapeutic exercises, therapeutic activities, manual therapy, edema management, modalities such as fluidotherapy, pt education, neuromuscular reeducation and HEP. Improved ROM noted in the wrist. Pt able to perform functional activities such as grooming, dressing, and home making tasks. However, per objective measurement pt displays weakness in pinch and , which are essential for functional activities such as meal prep, carrying groceries, and driving. Recommended continuation of skilled OT 1-2 x week/2-4 weeks to address remaining deficits, improve strength for pt to return to PLOF.    Goals (to be met in 8 visits)   Increase (L) AROM wrist flexion by 5-10 degrees to allow pt to perform housekeeping. MET/Upgrade 15-20 degree  Increase (L) AROM wrist extension by 5-10 degrees to allow pt to carry items in hand. MET/Upgrade 15-20 degree  Increase (L) AROM wrist UD  by 5-10 degrees to allow pt to open jars.  MET  Increase (L) AROM wrist RD by 5-10 degrees to allow pt to  tighten bottles.  Continue  Assess  and pinch when able. MET    Upgrade Goals:  Increase (L)  by 4-5 lbs to allow pt to carry groceries.   Increase (L) 2pt pinch by 2-3 lbs to allow pt to open water bottle.     Increase (L) 3pt pinch by 2-3 lbs to allow pt to use utensils while cooking       Plan  Patient will be seen 1-2 x week for 4-6 weeksx/week or a total of 8 visits over a 90 day    Treatment Last 4 Visits        4/10/2025 4/21/2025 4/23/2025 4/28/2025   OT Treatment   Treatment Day 4 5  6   Therapeutic Exercise AROM with fluidotherapy x 10\"  Powerweb x 3 min stretches  Hand helper 2 Y RB, 1 RB x 3 min  Flexbar perturbations (in neutral and in pronation x 2 min each) AROM with fluidotherapy x 10\"  Powerweb x 3 min stretches  Sponges with gripper at first rung  Flexbar strengthening wrist/flex, sup/pro x 2 min AROM with fluidotherapy x 10\"  Wrist exerciser x 3 min  Flexbar strengthening wrist flex/ext, sup/pro x 2 min each   AROM with fluidotherapy x 10\"  Flexbar strengthening wrist flex/ext, sup/pro x 2 min each  Powerweb x 3 min stretches  Objective Measures    Neuro Re-Educ Wobble board x 3 min  [place]risbee perturbations with 7 marbles    Kinesiotape along ECU with 50% stretch     Manual Therapy  STM  Edema massage   STM  Edema massage  Cross frictional massage STM   Neuro Re-Ed Min 15  5    Therapeutic Exercise Min 30 35 30 35   Manual Therapy Min  10 10 10   Total Timed Procedures 45 45 45 45   Total Service Procedures 45 45 45 45   Total Time 45 45 45 45         Charges     1MT, 2TE    Michelle Moore, SOT  Amanda Collins, OTR/L

## 2025-04-30 ENCOUNTER — OFFICE VISIT (OUTPATIENT)
Dept: PHYSICAL THERAPY | Age: 70
End: 2025-04-30
Attending: ORTHOPAEDIC SURGERY
Payer: MEDICARE

## 2025-04-30 PROCEDURE — 97140 MANUAL THERAPY 1/> REGIONS: CPT

## 2025-04-30 PROCEDURE — 97110 THERAPEUTIC EXERCISES: CPT

## 2025-04-30 NOTE — PROGRESS NOTES
Patient: Isela Zavala (69 year old, female) Referring Provider:  Insurance:   Diagnosis: Left wrist distal radius fracture Earl Stout  MEDICARE   Date of Surgery: No data recorded Next MD visit:  COMMERCIAL   Precautions:  None 4/30/2025 Referral Information:   Date of Injury: 2/3/2025 Date of Evaluation: Req: 0, Auth: 0, Exp:     03/26/25 POC Auth Visits:          Today's Date   4/30/2025    Subjective  Pt states she only has pain in certain wrist positions       Pain: 0/10     Objective  See tx log for details.      Assessment  Pt had decreased pain in the wrist as pt has been applying heat. Mild difficulty observed during pro/sup exercise with weights. Pt reports pain at the area surrounding ulnar styloid, most likely due to rotational movements and instability. Pt advised to pronate to tolerance to avoid exacerbating symptoms. Pt verbalize having such pain with gardening and moving wrist in certain positions. Pt managing pain with adjustment to hand positioning when completing functional activities.     Goals (to be met in 8 visits)   Increase (L) AROM wrist flexion by 5-10 degrees to allow pt to perform housekeeping.  Increase (L) AROM wrist extension by 5-10 degrees to allow pt to carry items in hand.    Increase (L) AROM wrist UD  by 5-10 degrees to allow pt to open jars.    Increase (L) AROM wrist RD by 5-10 degrees to allow pt to  tighten bottles.   Assess  and pinch when able.      Plan  Patient will be seen 1-2 x week for 4-6 weeksx/week or a total of 8 visits over a 90 day    Treatment Last 4 Visits        4/21/2025 4/23/2025 4/28/2025 4/30/2025   OT Treatment   Treatment Day 5  6 7   Therapeutic Exercise AROM with fluidotherapy x 10\"  Powerweb x 3 min stretches  Sponges with gripper at first rung  Flexbar strengthening wrist/flex, sup/pro x 2 min AROM with fluidotherapy x 10\"  Wrist exerciser x 3 min  Flexbar strengthening wrist flex/ext, sup/pro x 2 min each   AROM with fluidotherapy x  10\"  Flexbar strengthening wrist flex/ext, sup/pro x 2 min each  Powerweb x 3 min stretches  Objective Measures  AROM with fluidotherapy x 10\"  Wrist exerciser x 3 min  Wrist PREs (flex/ext) (pro/sup)   Neuro Re-Educ  Kinesiotape along ECU with 50% stretch      Manual Therapy STM  Edema massage   STM  Edema massage  Cross frictional massage STM    Neuro Re-Ed Min  5     Therapeutic Exercise Min 35 30 35 35   Manual Therapy Min 10 10 10 10   Total Timed Procedures 45 45 45 45   Total Service Procedures 45 45 45 45   Total Time 45 45 45 45         Charges     1MT, 2TE    Michelle Moore, SOT  Amanda Collins, OTR/L

## 2025-05-05 ENCOUNTER — OFFICE VISIT (OUTPATIENT)
Dept: PHYSICAL THERAPY | Age: 70
End: 2025-05-05
Attending: ORTHOPAEDIC SURGERY
Payer: MEDICARE

## 2025-05-05 PROCEDURE — 97110 THERAPEUTIC EXERCISES: CPT

## 2025-05-05 PROCEDURE — 97140 MANUAL THERAPY 1/> REGIONS: CPT

## 2025-05-05 NOTE — PROGRESS NOTES
Patient: Isela Zavala (69 year old, female) Referring Provider:  Insurance:   Diagnosis: Left wrist distal radius fracture Earl Stout  MEDICARE   Date of Surgery: No data recorded Next MD visit:  COMMERCIAL   Precautions:  None 4/30/2025 Referral Information:   Date of Injury: 2/3/2025 Date of Evaluation: Req: 0, Auth: 0, Exp:     03/26/25 POC Auth Visits:          Today's Date   5/5/2025    Subjective  Pt states she has no pain       Pain: 0/10     Objective  See tx log for details.      Assessment  Pt mentioned she did some yard work over the weekend. Pt noticed she is still having difficulty with supinating her wrist when she pull out weeds. Recommended pt use weed  tool with long handles to minimize the need to supinate. Pt had mild pain during the session at the ulnar styloid due to rotational movements and instability. However, pt was able to complete activities during session after readjusting hand positioning and staying within tolerance.     Goals (to be met in 8 visits)   Increase (L) AROM wrist flexion by 15-20 degrees to allow pt to perform housekeeping.  Increase (L) AROM wrist extension by 15-20 degrees to allow pt to carry items in hand.    Increase (L) AROM wrist RD by 5-10 degrees to allow pt to  tighten bottles.     Additional Goals:  Increase (L) by 4-5 IBS to allow pt to carry groceries.  Increase (L) 2pt pinch by 2-3Ibs to allow pt to open water bottle.   Increase (L) 3 pt pinch by 2-3Ibs to allow pt to use utensils while cooking.     Plan  Patient will be seen 1-2 x week for 4-6 weeksx/week or a total of 8 visits over a 90 day    Treatment Last 4 Visits        4/23/2025 4/28/2025 4/30/2025 5/5/2025   OT Treatment   Treatment Day  6 7 8   Therapeutic Exercise AROM with fluidotherapy x 10\"  Wrist exerciser x 3 min  Flexbar strengthening wrist flex/ext, sup/pro x 2 min each   AROM with fluidotherapy x 10\"  Flexbar strengthening wrist flex/ext, sup/pro x 2 min each  Powerweb x 3 min  stretches  Objective Measures  AROM with fluidotherapy x 10\"  Wrist exerciser x 3 min  Wrist PREs (flex/ext) (pro/sup) AROM w/ fluidotherapy x 10\"  Flexbar twist (flex/ext)  PRE's w/ 1# (felx/ext, UD/RD, sup/pro) 2 min each  Powerweb stretches    Neuro Re-Educ Kinesiotape along ECU with 50% stretch       Manual Therapy STM  Edema massage  Cross frictional massage STM  STM   Neuro Re-Ed Min 5      Therapeutic Exercise Min 30 35 35 35   Manual Therapy Min 10 10 10 10   Total Timed Procedures 45 45 45 45   Total Service Procedures 45 45 45 45   Total Time 45 45 45 45         Charges     1MT, 2 TE    Michelle Moore, SOT  Amanda Collins, OTR/L

## 2025-05-07 ENCOUNTER — OFFICE VISIT (OUTPATIENT)
Dept: PHYSICAL THERAPY | Age: 70
End: 2025-05-07
Attending: ORTHOPAEDIC SURGERY
Payer: MEDICARE

## 2025-05-07 PROCEDURE — 97110 THERAPEUTIC EXERCISES: CPT

## 2025-05-07 PROCEDURE — 97140 MANUAL THERAPY 1/> REGIONS: CPT

## 2025-05-07 NOTE — PROGRESS NOTES
Patient: Isela Zavala (69 year old, female) Referring Provider:  Insurance:   Diagnosis: Left wrist distal radius fracture Earl Stout  MEDICARE   Date of Surgery: No data recorded Next MD visit:  COMMERCIAL   Precautions:  None 4/30/2025 Referral Information:   Date of Injury: 2/3/2025 Date of Evaluation: Req: 0, Auth: 0, Exp:     03/26/25 POC Auth Visits:          Today's Date   5/7/2025    Subjective  Pt states she has no pain but has stiffness in the morning       Pain: 0/10     Objective  See tx log for details.      Assessment  Pt had some mild swelling at the dorsal side of hand and wrist. Performed retrograde massage for edema which seemed to alleviate symptoms. Reviewed edema management with pt including massage, compression and elevation. Pt verbalized good understanding of above principles. Noted stiffness in the hand most likely due to muscular tightness. Advised pt to apply heat to reduce stiffness. Pt had mild difficulty with putty exercise due to reduced pinch strength and tissue tightness. Pt has been performing well with increased resistance.    Goals (to be met in 8 visits)   Increase (L) AROM wrist flexion by 15-20 degrees to allow pt to perform housekeeping.  Increase (L) AROM wrist extension by 15-20 degrees to allow pt to carry items in hand.    Increase (L) AROM wrist RD by 5-10 degrees to allow pt to  tighten bottles.     Additional Goals:  Increase (L) by 4-5 IBS to allow pt to carry groceries.  Increase (L) 2pt pinch by 2-3Ibs to allow pt to open water bottle.   Increase (L) 3 pt pinch by 2-3Ibs to allow pt to use utensils while cooking.         Plan  Patient will be seen 1-2 x week for 4-6 weeksx/week or a total of 8 visits over a 90 day    Treatment Last 4 Visits        4/28/2025 4/30/2025 5/5/2025 5/7/2025   OT Treatment   Treatment Day 6 7 8 9   Therapeutic Exercise AROM with fluidotherapy x 10\"  Flexbar strengthening wrist flex/ext, sup/pro x 2 min each  Powerweb x 3 min  stretches  Objective Measures  AROM with fluidotherapy x 10\"  Wrist exerciser x 3 min  Wrist PREs (flex/ext) (pro/sup) AROM w/ fluidotherapy x 10\"  Flexbar twist (flex/ext)  PRE's w/ 1# (felx/ext, UD/RD, sup/pro) 2 min each  Powerweb stretches  AROM w/fluidotherapy x 10\"  Pro/sup forearm workout x 2\"  Wrist Maze x 2\"  Y Putty w/ 3 small pegs    Manual Therapy STM  STM STM  Edema massage   Therapeutic Exercise Min 35 35 35 35   Manual Therapy Min 10 10 10 10   Total Timed Procedures 45 45 45 45   Total Service Procedures 45 45 45 45   Total Time 45 45 45 45         Charges     1MT, 2TE    Amanda Collins OTR/MERNA OzunaT

## 2025-05-13 ENCOUNTER — OFFICE VISIT (OUTPATIENT)
Dept: PHYSICAL THERAPY | Age: 70
End: 2025-05-13
Attending: ORTHOPAEDIC SURGERY
Payer: MEDICARE

## 2025-05-13 PROCEDURE — 97110 THERAPEUTIC EXERCISES: CPT

## 2025-05-13 PROCEDURE — 97140 MANUAL THERAPY 1/> REGIONS: CPT

## 2025-05-13 NOTE — PROGRESS NOTES
Patient: Isela Zavala (69 year old, female) Referring Provider:  Insurance:   Diagnosis: Left wrist distal radius fracture Earl Stout  MEDICARE   Date of Surgery: No data recorded Next MD visit:  COMMERCIAL   Precautions:  None 4/30/2025 Referral Information:   Date of Injury: 2/3/2025 Date of Evaluation: Req: 0, Auth: 0, Exp:     03/26/25 POC Auth Visits:          Today's Date   5/13/2025    Subjective  Pt states that she has some mild decreased flexiblity in her wrist still.       Pain:  0/10     Objective  See tx log for details.        Assessment  Pt having mild tightness when performing gardening with certain movements. No tightness noted with end ROM in wrist. Increased resistance levels in clinic which pt tolerated well.  Advised pt to continue to participate with functional actvites and to ice for pain managemnt and edema as needed. Pt agreed.    Goals (to be met in 8 visits)   Increase (L) AROM wrist flexion by 5-10 degrees to allow pt to perform housekeeping.  Increase (L) AROM wrist extension by 5-10 degrees to allow pt to carry items in hand.    Increase (L) AROM wrist UD  by 5-10 degrees to allow pt to open jars.    Increase (L) AROM wrist RD by 5-10 degrees to allow pt to  tighten bottles.   Assess  and pinch when able.                              Plan  Discharge at next visit    Treatment Last 4 Visits        4/30/2025 5/5/2025 5/7/2025 5/13/2025   OT Treatment   Treatment Day 7 8 9 10   Therapeutic Exercise AROM with fluidotherapy x 10\"  Wrist exerciser x 3 min  Wrist PREs (flex/ext) (pro/sup) AROM w/ fluidotherapy x 10\"  Flexbar twist (flex/ext)  PRE's w/ 1# (felx/ext, UD/RD, sup/pro) 2 min each  Powerweb stretches  AROM w/fluidotherapy x 10\"  Pro/sup forearm workout x 2\"  Wrist Maze x 2\"  Y Putty w/ 3 small pegs  AROM w/ fluidotherapy x 10\"  PRE's w/ 2# (flex/ext, UD/RD) 2 min each  Foam pad weight bearing x 3 min with 10 sec hold  Hand helper  1 R RB and 2 Y RB  Forearm work out (with 2  weights) sup/pro x 2 min  Digiflex (red) x 3 min       Manual Therapy  STM STM  Edema massage STM   Therapeutic Exercise Min 35 35 35 35   Manual Therapy Min 10 10 10 10   Total Timed Procedures 45 45 45 45   Total Service Procedures 45 45 45 45   Total Time 45 45 45 45              Charges     1MT, 2 TE    Amanda Collins,OTR/L

## 2025-05-16 ENCOUNTER — OFFICE VISIT (OUTPATIENT)
Dept: PHYSICAL THERAPY | Age: 70
End: 2025-05-16
Attending: ORTHOPAEDIC SURGERY
Payer: MEDICARE

## 2025-05-16 PROCEDURE — 97110 THERAPEUTIC EXERCISES: CPT

## 2025-05-16 NOTE — PROGRESS NOTES
DISCHARGE SUMMARY      Patient: Isela Zavala (69 year old, female) Referring Provider:  Insurance:   Diagnosis: Left wrist distal radius fracture Earl Stout  MEDICARE   Date of Surgery: No data recorded Next MD visit:  COMMERCIAL   Precautions:  None 4/30/2025 Referral Information:   Date of Injury: 2/3/2025 Date of Evaluation: Req: 0, Auth: 0, Exp:     03/26/25 POC Auth Visits:          Today's Date   5/16/2025    Subjective  Pt states that she is able to do everything now and if she has some dificulty, she is careful       Pain: 0/10     Objective  See tx log for details.    Wrist       3/26/2025 4/28/2025 5/16/2025   Wrist ROM/MMT   Rt Wrist Flex (C7) 50     Lt Wrist Flex (C7) 30 51 75   Rt Wrist ext (C6) 59     Lt Wrist ext (C6) 46 50 65   Rt Wrist Ulnar Deviation 55     Lt Wrist Ulnar Deviation 20 20 35   Rt Wrist Radial Deviation 29     Lt Wrist Radial Deviation 20 25 25    Hand Strength       4/28/2025 5/16/2025   Hand Strength    Rt 31     Lt 13 25   2 Pt Pinch Rt 6    2 Pt Pinch Lt 3 6   3 Pt Pinch Rt 7    3 Pt Pinch Lt 3 6   Lateral Pinch Rt 12    Lateral Pinch Lt 9 11           Assessment  Pt seen for 11/11 visits lenny initial evaluation on 3/26/2025. Treatment has consisted of therapeutic exercises, therapeutic activities, manual therapy, edema mangement, modalities prn such as fluidotherapy, pt education including proper body mechanics, neuromuscluar reeducation,  and HEP.  Improved ROM,  and pinch strength noted since IE. Pt performing most functional activites at this time but not limited to washing dishes, dressing, light housework and grooming. No further OT needed at this time as pt is able to complete all ADL's and IADL's. Pt to continue with HEP and follow up with MD as needed.    Goals (to be met in 11 visits)   Increase (L) AROM wrist flexion by 5-10 degrees to allow pt to perform housekeeping. MET  Increase (L) AROM wrist extension by 5-10 degrees to allow pt to carry items in  hand.  MET  Increase (L) AROM wrist UD  by 5-10 degrees to allow pt to open jars.  MET  Increase (L) AROM wrist RD by 5-10 degrees to allow pt to  tighten bottles. MET  Assess  and pinch when able. MET         Plan  Discharge pt from OT as pt has met all goals.    Treatment Last 4 Visits        5/5/2025 5/7/2025 5/13/2025 5/16/2025   OT Treatment   Treatment Day 8 9 10 11   Therapeutic Exercise AROM w/ fluidotherapy x 10\"  Flexbar twist (flex/ext)  PRE's w/ 1# (felx/ext, UD/RD, sup/pro) 2 min each  Powerweb stretches  AROM w/fluidotherapy x 10\"  Pro/sup forearm workout x 2\"  Wrist Maze x 2\"  Y Putty w/ 3 small pegs  AROM w/ fluidotherapy x 10\"  PRE's w/ 2# (flex/ext, UD/RD) 2 min each  Foam pad weight bearing x 3 min with 10 sec hold  Hand helper  1 R RB and 2 Y RB  Forearm work out (with 2 weights) sup/pro x 2 min  Digiflex (red) x 3 min     AROM with fluidotherapy x 10\"  Objective measurements x 10\"   Manual Therapy STM STM  Edema massage STM    Therapeutic Exercise Min 35 35 35 30   Manual Therapy Min 10 10 10    Total Timed Procedures 45 45 45 30   Total Service Procedures 45 45 45 30   Total Time 45 45 45 30              Charges     2 YOLIS Collins,OTR/L

## 2025-05-20 ENCOUNTER — APPOINTMENT (OUTPATIENT)
Dept: PHYSICAL THERAPY | Age: 70
End: 2025-05-20
Attending: ORTHOPAEDIC SURGERY
Payer: MEDICARE

## 2025-05-23 ENCOUNTER — APPOINTMENT (OUTPATIENT)
Dept: PHYSICAL THERAPY | Age: 70
End: 2025-05-23
Attending: ORTHOPAEDIC SURGERY
Payer: MEDICARE

## (undated) DEVICE — 60 ML SYRINGE REGULAR TIP: Brand: MONOJECT

## (undated) DEVICE — SNARE ENDOSCOPIC 10MM ROUND

## (undated) DEVICE — MEDI-VAC NON-CONDUCTIVE SUCTION TUBING 6MM X 1.8M (6FT.) L: Brand: CARDINAL HEALTH

## (undated) DEVICE — MEDI-VAC NON-CONDUCTIVE SUCTION TUBING: Brand: CARDINAL HEALTH

## (undated) DEVICE — FORCEP RADIAL JAW 4

## (undated) DEVICE — KIT ENDO ORCAPOD 160/180/190

## (undated) DEVICE — YANKAUER SUCTION INSTRUMENT NO CONTROL VENT, BULB TIP, CLEAR: Brand: YANKAUER

## (undated) DEVICE — Device: Brand: DUAL NARE NASAL CANNULAE FEMALE LUER CON 7FT O2 TUBE

## (undated) DEVICE — KIT CLEAN ENDOKIT 1.1OZ GOWNX2

## (undated) DEVICE — CONMED SCOPE SAVER BITE BLOCK, 20X27 MM: Brand: SCOPE SAVER

## (undated) DEVICE — FORCEPS BX L240CM DIA2.4MM L NDL RAD JAW 4

## (undated) DEVICE — SNARE OPTMZ PLPCTM TRP

## (undated) NOTE — MR AVS SNAPSHOT
[facility] [place] [address] [address] [id number] [phone]               Thank you for choosing us for your health care visit with Reed Ramirez MD.  We are glad to serve you and happy to provide you with this summary of Summaries. If you've been to the Emergency Department or your doctor's office, you can view your past visit information in [employer] by going to Visits < Visit Summaries. [employer] questions? Call [phone] for help.   [employer] is NOT to be used for urge

## (undated) NOTE — LETTER
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   This is a benign (not cancerous) growth, and requires no further treat

## (undated) NOTE — LETTER
02/05/18        [address] [place] [address]      Dear Norbert Sandifer records indicate that you have outstanding lab work and or testing that was ordered for you and has not yet been completed:          HCV Antibody  To provide

## (undated) NOTE — LETTER
[address] [facility] [address] [place], [place]  Authorization for Surgical Operation and Procedure                                                                                           I hereby authorize Juan Last MD, my physician and his/her assistants (if applicable), which may include medical students, residents, and/or fellows, to perform the following surgical operation/ procedure and administer such anesthesia as may be determined necessary by my physician: Operation/Procedure name (s) ESOPHAGOGASTRODUODENOSCOPY (EGD) on Hailee Moreno   2. I recognize that during the surgical operation/procedure, unforeseen conditions may necessitate additional or different procedures than those listed above. I, therefore, further authorize and request that the above-named surgeon, assistants, or designees perform such procedures as are, in their judgment, necessary and desirable. 3.   My surgeon/physician has discussed prior to my surgery the potential benefits, risks and side effects of this procedure; the likelihood of achieving goals; and potential problems that might occur during recuperation. They also discussed reasonable alternatives to the procedure, including risks, benefits, and side effects related to the alternatives and risks related to not receiving this procedure. I have had all my questions answered and I acknowledge that no guarantee has been made as to the result that may be obtained. 4.   Should the need arise during my operation/procedure, which includes change of level of care prior to discharge, I also consent to the administration of blood and/or blood products. Further, I understand that despite careful testing and screening of blood or blood products by collecting agencies, I may still be subject to ill effects as a result of receiving a blood transfusion and/or blood products.   The following are some, but not all, of the potential risks that can occur: fever and allergic reactions, hemolytic reactions, transmission of diseases such as Hepatitis, AIDS and Cytomegalovirus (CMV) and fluid overload. In the event that I wish to have an autologous transfusion of my own blood, or a directed donor transfusion, I will discuss this with my physician. Check only if Refusing Blood or Blood Products  I understand refusal of blood or blood products as deemed necessary by my physician may have serious consequences to my condition to include possible death. I hereby assume responsibility for my refusal and release the hospital, its personnel, and my physicians from any responsibility for the consequences of my refusal.    o  Refuse   5. I authorize the use of any specimen, organs, tissues, body parts or foreign objects that may be removed from my body during the operation/procedure for diagnosis, research or teaching purposes and their subsequent disposal by hospital authorities. I also authorize the release of specimen test results and/or written reports to my treating physician on the hospital medical staff or other referring or consulting physicians involved in my care, at the discretion of the Pathologist or my treating physician. 6.   I consent to the photographing or videotaping of the operations or procedures to be performed, including appropriate portions of my body for medical, scientific, or educational purposes, provided my identity is not revealed by the pictures or by descriptive texts accompanying them. If the procedure has been photographed/videotaped, the surgeon will obtain the original picture, image, videotape or CD. The hospital will not be responsible for storage, release or maintenance of the picture, image, tape or CD.    7.   I consent to the presence of a  or observers in the operating room as deemed necessary by my physician or their designees.     8.   I recognize that in the event my procedure results in extended X-Ray/fluoroscopy time, I may develop a skin reaction. 9. If I have a Do Not Attempt Resuscitation (DNAR) order in place, that status will be suspended while in the operating room, procedural suite, and during the recovery period unless otherwise explicitly stated by me (or a person authorized to consent on my behalf). The surgeon or my attending physician will determine when the applicable recovery period ends for purposes of reinstating the DNAR order. 10. Patients having a sterilization procedure: I understand that if the procedure is successful the results will be permanent and it will therefore be impossible for me to inseminate, conceive, or bear children. I also understand that the procedure is intended to result in sterility, although the result has not been guaranteed. 11. I acknowledge that my physician has explained sedation/analgesia administration to me including the risk and benefits I consent to the administration of sedation/analgesia as may be necessary or desirable in the judgment of my physician. I CERTIFY THAT I HAVE READ AND FULLY UNDERSTAND THE ABOVE CONSENT TO OPERATION and/or OTHER PROCEDURE.     _________________________________________ _________________________________     ___________________________________  Signature of Patient     Signature of Responsible Person                   Printed Name of Responsible Person                              _________________________________________ ______________________________        ___________________________________  Signature of Witness         Date  Time         Relationship to Patient    STATEMENT OF PHYSICIAN My signature below affirms that prior to the time of the procedure; I have explained to the patient and/or his/her legal representative, the risks and benefits involved in the proposed treatment and any reasonable alternative to the proposed treatment.  I have also explained the risks and benefits involved in refusal of the proposed treatment and alternatives to the proposed treatment and have answered the patient's questions.  If I have a significant financial interest in a co-management agreement or a significant financial interest in any product or implant, or other significant relationship used in this procedure/surgery, I have disclosed this and had a discussion with my patient.     _______________________________________________________________ _____________________________  Abraham Lema Physician)                                                                                         (Date)                                   (Time)  Patient Name: Ping Pimentel    : 10/23/1955   Printed: 10/2/2023      Medical Record #: [id number]                                              Page 1 of 1

## (undated) NOTE — LETTER
02/05/18        6 [address]      Dear Kimberly Sierra records indicate that you have outstanding lab work and or testing that was ordered for you and has not yet been completed:          CBC W Differential W Plat

## (undated) NOTE — LETTER
[address] [facility]    Consent for Coronary CT Angiography    Your doctor has recommended that you, Yocasta Cunningham,  have a Coronary CT Angiography procedure.  Coronary CT Angiography is a diagnostic procedure using computed prema patient when taking medication. Allergic reactions to medication can range from very minor to very serious leading to a life threatening situation or even death. Please be sure to communicate any allergy you may have to your caregiver immediately.     The i

## (undated) NOTE — Clinical Note
1/18/2017              Freddie Cardona[address] [address]         Dear Adela Nugent,    This letter is to inform you that our office has made several attempts to reach you by phone without success.   We were attempting to contact y

## (undated) NOTE — LETTER
[address]  Authorization for Surgical Operation and Procedure                                                                                           1. I hereby authorize Juan Roberts MD, my physician and his/her assistants (if applicable), which may include medical students, residents, and/or fellows, to perform the following surgical operation/ procedure and administer such anesthesia as may be determined necessary by my physician: Operation/Procedure name (s) COLONOSCOPY on [facility]   2. I recognize that during the surgical operation/procedure, unforeseen conditions may necessitate additional or different procedures than those listed above. I, therefore, further authorize and request that the above-named surgeon, assistants, or designees perform such procedures as are, in their judgment, necessary and desirable. 3.   My surgeon/physician has discussed prior to my surgery the potential benefits, risks and side effects of this procedure; the likelihood of achieving goals; and potential problems that might occur during recuperation. They also discussed reasonable alternatives to the procedure, including risks, benefits, and side effects related to the alternatives and risks related to not receiving this procedure. I have had all my questions answered and I acknowledge that no guarantee has been made as to the result that may be obtained. 4.   Should the need arise during my operation/procedure, which includes change of level of care prior to discharge, I also consent to the administration of blood and/or blood products. Further, I understand that despite careful testing and screening of blood or blood products by collecting agencies, I may still be subject to ill effects as a result of receiving a blood transfusion and/or blood products.   The following are some, but not all, of the potential risks that can occur: fever and allergic reactions, hemolytic reactions, transmission of diseases such as Hepatitis, AIDS and Cytomegalovirus (CMV) and fluid overload. In the event that I wish to have an autologous transfusion of my own blood, or a directed donor transfusion, I will discuss this with my physician. Check only if Refusing Blood or Blood Products  I understand refusal of blood or blood products as deemed necessary by my physician may have serious consequences to my condition to include possible death. I hereby assume responsibility for my refusal and release the hospital, its personnel, and my physicians from any responsibility for the consequences of my refusal.    o  Refuse   5. I authorize the use of any specimen, organs, tissues, body parts or foreign objects that may be removed from my body during the operation/procedure for diagnosis, research or teaching purposes and their subsequent disposal by hospital authorities. I also authorize the release of specimen test results and/or written reports to my treating physician on the hospital medical staff or other referring or consulting physicians involved in my care, at the discretion of the Pathologist or my treating physician. 6.   I consent to the photographing or videotaping of the operations or procedures to be performed, including appropriate portions of my body for medical, scientific, or educational purposes, provided my identity is not revealed by the pictures or by descriptive texts accompanying them. If the procedure has been photographed/videotaped, the surgeon will obtain the original picture, image, videotape or CD. The hospital will not be responsible for storage, release or maintenance of the picture, image, tape or CD.    7.   I consent to the presence of a  or observers in the operating room as deemed necessary by my physician or their designees.     8.   I recognize that in the event my procedure results in extended X-Ray/fluoroscopy time, I may develop a skin reaction. 9. If I have a Do Not Attempt Resuscitation (DNAR) order in place, that status will be suspended while in the operating room, procedural suite, and during the recovery period unless otherwise explicitly stated by me (or a person authorized to consent on my behalf). The surgeon or my attending physician will determine when the applicable recovery period ends for purposes of reinstating the DNAR order. 10. Patients having a sterilization procedure: I understand that if the procedure is successful the results will be permanent and it will therefore be impossible for me to inseminate, conceive, or bear children. I also understand that the procedure is intended to result in sterility, although the result has not been guaranteed. 11. I acknowledge that my physician has explained sedation/analgesia administration to me including the risk and benefits I consent to the administration of sedation/analgesia as may be necessary or desirable in the judgment of my physician. I CERTIFY THAT I HAVE READ AND FULLY UNDERSTAND THE ABOVE CONSENT TO OPERATION and/or OTHER PROCEDURE.     _________________________________________ _________________________________     ___________________________________  Signature of Patient     Signature of Responsible Person                   Printed Name of Responsible Person                              _________________________________________ ______________________________        ___________________________________  Signature of Witness         Date  Time         Relationship to Patient    STATEMENT OF PHYSICIAN My signature below affirms that prior to the time of the procedure; I have explained to the patient and/or his/her legal representative, the risks and benefits involved in the proposed treatment and any reasonable alternative to the proposed treatment.  I have also explained the risks and benefits involved in refusal of the proposed treatment and alternatives to the proposed treatment and have answered the patient's questions.  If I have a significant financial interest in a co-management agreement or a significant financial interest in any product or implant, or other significant relationship used in this procedure/surgery, I have disclosed this and had a discussion with my patient.     _______________________________________________________________ _____________________________  Donna Kendall Physician)                                                                                         (Date)                                   (Time)  Patient Name: Harish Smith    : 10/23/1955   Printed: 2023      Medical Record #: [id number]                                              Page 1 of 1